# Patient Record
Sex: FEMALE | Race: WHITE | NOT HISPANIC OR LATINO | Employment: FULL TIME | URBAN - METROPOLITAN AREA
[De-identification: names, ages, dates, MRNs, and addresses within clinical notes are randomized per-mention and may not be internally consistent; named-entity substitution may affect disease eponyms.]

---

## 2017-02-06 ENCOUNTER — HOSPITAL ENCOUNTER (OUTPATIENT)
Dept: PHYSICAL MEDICINE AND REHAB | Facility: CLINIC | Age: 42
Discharge: HOME | End: 2017-02-06
Attending: NURSE PRACTITIONER

## 2017-02-06 DIAGNOSIS — M54.16 RIGHT LUMBAR RADICULITIS: ICD-10-CM

## 2017-02-06 DIAGNOSIS — M51.26 LUMBAR DISC HERNIATION: ICD-10-CM

## 2017-02-06 DIAGNOSIS — M54.41 RIGHT-SIDED LOW BACK PAIN WITH RIGHT-SIDED SCIATICA: ICD-10-CM

## 2017-07-23 ENCOUNTER — COMMUNICATION - HEALTHEAST (OUTPATIENT)
Dept: PHYSICAL MEDICINE AND REHAB | Facility: CLINIC | Age: 42
End: 2017-07-23

## 2017-07-23 DIAGNOSIS — M54.41 RIGHT-SIDED LOW BACK PAIN WITH RIGHT-SIDED SCIATICA: ICD-10-CM

## 2017-11-20 ENCOUNTER — HOSPITAL ENCOUNTER (OUTPATIENT)
Dept: PHYSICAL MEDICINE AND REHAB | Facility: CLINIC | Age: 42
Discharge: HOME | End: 2017-11-20
Attending: NURSE PRACTITIONER

## 2017-11-20 DIAGNOSIS — G89.29 CHRONIC RIGHT-SIDED LOW BACK PAIN WITH RIGHT-SIDED SCIATICA: ICD-10-CM

## 2017-11-20 DIAGNOSIS — M54.41 CHRONIC RIGHT-SIDED LOW BACK PAIN WITH RIGHT-SIDED SCIATICA: ICD-10-CM

## 2017-11-20 DIAGNOSIS — M51.26 LUMBAR DISC HERNIATION: ICD-10-CM

## 2017-11-20 DIAGNOSIS — M54.16 RIGHT LUMBAR RADICULITIS: ICD-10-CM

## 2018-03-05 ENCOUNTER — HOSPITAL ENCOUNTER (OUTPATIENT)
Dept: PHYSICAL MEDICINE AND REHAB | Facility: CLINIC | Age: 43
Discharge: HOME | End: 2018-03-05
Attending: PHYSICIAN ASSISTANT

## 2018-03-05 DIAGNOSIS — M51.26 LUMBAR DISC HERNIATION: ICD-10-CM

## 2018-03-05 DIAGNOSIS — M54.16 LUMBAR RADICULITIS: ICD-10-CM

## 2018-03-05 DIAGNOSIS — M54.16 RIGHT LUMBAR RADICULITIS: ICD-10-CM

## 2018-06-22 ENCOUNTER — HOSPITAL ENCOUNTER (OUTPATIENT)
Dept: PHYSICAL MEDICINE AND REHAB | Facility: CLINIC | Age: 43
Discharge: HOME | End: 2018-06-22
Attending: NURSE PRACTITIONER

## 2018-06-22 DIAGNOSIS — M79.18 MYOFASCIAL PAIN: ICD-10-CM

## 2018-06-22 DIAGNOSIS — M54.41 CHRONIC RIGHT-SIDED LOW BACK PAIN WITH RIGHT-SIDED SCIATICA: ICD-10-CM

## 2018-06-22 DIAGNOSIS — M54.16 LUMBAR RADICULITIS: ICD-10-CM

## 2018-06-22 DIAGNOSIS — M51.26 LUMBAR DISC HERNIATION: ICD-10-CM

## 2018-06-22 DIAGNOSIS — G89.29 CHRONIC RIGHT-SIDED LOW BACK PAIN WITH RIGHT-SIDED SCIATICA: ICD-10-CM

## 2018-07-06 ENCOUNTER — COMMUNICATION - HEALTHEAST (OUTPATIENT)
Dept: PHYSICAL MEDICINE AND REHAB | Facility: CLINIC | Age: 43
End: 2018-07-06

## 2018-07-09 ENCOUNTER — AMBULATORY - HEALTHEAST (OUTPATIENT)
Dept: PHYSICAL MEDICINE AND REHAB | Facility: CLINIC | Age: 43
End: 2018-07-09

## 2018-07-09 DIAGNOSIS — M54.41 CHRONIC RIGHT-SIDED LOW BACK PAIN WITH RIGHT-SIDED SCIATICA: ICD-10-CM

## 2018-07-09 DIAGNOSIS — M54.16 RIGHT LUMBAR RADICULITIS: ICD-10-CM

## 2018-07-09 DIAGNOSIS — G89.29 CHRONIC RIGHT-SIDED LOW BACK PAIN WITH RIGHT-SIDED SCIATICA: ICD-10-CM

## 2018-10-05 ENCOUNTER — HOSPITAL ENCOUNTER (OUTPATIENT)
Dept: PHYSICAL MEDICINE AND REHAB | Facility: CLINIC | Age: 43
Discharge: HOME | End: 2018-10-05
Attending: NURSE PRACTITIONER

## 2018-10-05 DIAGNOSIS — M54.16 RIGHT LUMBAR RADICULITIS: ICD-10-CM

## 2018-10-05 DIAGNOSIS — M54.16 LUMBAR RADICULITIS: ICD-10-CM

## 2018-10-05 DIAGNOSIS — M54.41 CHRONIC RIGHT-SIDED LOW BACK PAIN WITH RIGHT-SIDED SCIATICA: ICD-10-CM

## 2018-10-05 DIAGNOSIS — G89.29 CHRONIC RIGHT-SIDED LOW BACK PAIN WITH RIGHT-SIDED SCIATICA: ICD-10-CM

## 2018-10-05 DIAGNOSIS — M79.18 MYOFASCIAL PAIN: ICD-10-CM

## 2018-10-05 DIAGNOSIS — M51.26 LUMBAR DISC HERNIATION: ICD-10-CM

## 2019-02-14 ENCOUNTER — HOSPITAL ENCOUNTER (OUTPATIENT)
Dept: PHYSICAL MEDICINE AND REHAB | Facility: CLINIC | Age: 44
Discharge: HOME | End: 2019-02-14
Attending: NURSE PRACTITIONER

## 2019-02-14 DIAGNOSIS — M79.18 MYOFASCIAL PAIN: ICD-10-CM

## 2019-02-14 DIAGNOSIS — M51.26 LUMBAR DISC HERNIATION: ICD-10-CM

## 2019-02-14 DIAGNOSIS — M54.16 RIGHT LUMBAR RADICULITIS: ICD-10-CM

## 2019-02-14 DIAGNOSIS — G89.29 CHRONIC RIGHT-SIDED LOW BACK PAIN WITH RIGHT-SIDED SCIATICA: ICD-10-CM

## 2019-02-14 DIAGNOSIS — M54.41 CHRONIC RIGHT-SIDED LOW BACK PAIN WITH RIGHT-SIDED SCIATICA: ICD-10-CM

## 2019-04-28 ENCOUNTER — COMMUNICATION - HEALTHEAST (OUTPATIENT)
Dept: PHYSICAL MEDICINE AND REHAB | Facility: CLINIC | Age: 44
End: 2019-04-28

## 2019-04-28 DIAGNOSIS — M54.41 CHRONIC RIGHT-SIDED LOW BACK PAIN WITH RIGHT-SIDED SCIATICA: ICD-10-CM

## 2019-04-28 DIAGNOSIS — G89.29 CHRONIC RIGHT-SIDED LOW BACK PAIN WITH RIGHT-SIDED SCIATICA: ICD-10-CM

## 2019-04-28 DIAGNOSIS — M54.16 RIGHT LUMBAR RADICULITIS: ICD-10-CM

## 2019-06-21 ENCOUNTER — COMMUNICATION - HEALTHEAST (OUTPATIENT)
Dept: PHYSICAL MEDICINE AND REHAB | Facility: CLINIC | Age: 44
End: 2019-06-21

## 2019-06-21 DIAGNOSIS — M54.16 RIGHT LUMBAR RADICULITIS: ICD-10-CM

## 2019-06-21 DIAGNOSIS — G89.29 CHRONIC RIGHT-SIDED LOW BACK PAIN WITH RIGHT-SIDED SCIATICA: ICD-10-CM

## 2019-06-21 DIAGNOSIS — M54.41 CHRONIC RIGHT-SIDED LOW BACK PAIN WITH RIGHT-SIDED SCIATICA: ICD-10-CM

## 2019-06-25 ENCOUNTER — COMMUNICATION - HEALTHEAST (OUTPATIENT)
Dept: PHYSICAL MEDICINE AND REHAB | Facility: CLINIC | Age: 44
End: 2019-06-25

## 2019-06-25 DIAGNOSIS — M51.26 LUMBAR DISC HERNIATION: ICD-10-CM

## 2019-06-25 DIAGNOSIS — G89.29 CHRONIC RIGHT-SIDED LOW BACK PAIN WITH RIGHT-SIDED SCIATICA: ICD-10-CM

## 2019-06-25 DIAGNOSIS — M54.41 CHRONIC RIGHT-SIDED LOW BACK PAIN WITH RIGHT-SIDED SCIATICA: ICD-10-CM

## 2019-09-24 ENCOUNTER — HOSPITAL ENCOUNTER (OUTPATIENT)
Dept: PHYSICAL MEDICINE AND REHAB | Facility: CLINIC | Age: 44
Discharge: HOME | End: 2019-09-24
Attending: NURSE PRACTITIONER

## 2019-09-24 DIAGNOSIS — M51.26 LUMBAR DISC HERNIATION: ICD-10-CM

## 2019-09-24 DIAGNOSIS — M79.18 MYOFASCIAL PAIN: ICD-10-CM

## 2019-09-24 DIAGNOSIS — M54.16 RIGHT LUMBAR RADICULITIS: ICD-10-CM

## 2019-09-24 DIAGNOSIS — G89.29 CHRONIC RIGHT-SIDED LOW BACK PAIN WITH RIGHT-SIDED SCIATICA: ICD-10-CM

## 2019-09-24 DIAGNOSIS — M54.41 CHRONIC RIGHT-SIDED LOW BACK PAIN WITH RIGHT-SIDED SCIATICA: ICD-10-CM

## 2019-12-08 ENCOUNTER — COMMUNICATION - HEALTHEAST (OUTPATIENT)
Dept: PHYSICAL MEDICINE AND REHAB | Facility: CLINIC | Age: 44
End: 2019-12-08

## 2019-12-08 DIAGNOSIS — M54.16 RIGHT LUMBAR RADICULITIS: ICD-10-CM

## 2019-12-08 DIAGNOSIS — G89.29 CHRONIC RIGHT-SIDED LOW BACK PAIN WITH RIGHT-SIDED SCIATICA: ICD-10-CM

## 2019-12-08 DIAGNOSIS — M54.41 CHRONIC RIGHT-SIDED LOW BACK PAIN WITH RIGHT-SIDED SCIATICA: ICD-10-CM

## 2019-12-08 DIAGNOSIS — M51.26 LUMBAR DISC HERNIATION: ICD-10-CM

## 2019-12-28 ENCOUNTER — COMMUNICATION - HEALTHEAST (OUTPATIENT)
Dept: PHYSICAL MEDICINE AND REHAB | Facility: CLINIC | Age: 44
End: 2019-12-28

## 2019-12-28 DIAGNOSIS — M54.41 CHRONIC RIGHT-SIDED LOW BACK PAIN WITH RIGHT-SIDED SCIATICA: ICD-10-CM

## 2019-12-28 DIAGNOSIS — G89.29 CHRONIC RIGHT-SIDED LOW BACK PAIN WITH RIGHT-SIDED SCIATICA: ICD-10-CM

## 2019-12-28 DIAGNOSIS — M54.16 RIGHT LUMBAR RADICULITIS: ICD-10-CM

## 2020-01-10 ENCOUNTER — HOSPITAL ENCOUNTER (OUTPATIENT)
Dept: PHYSICAL MEDICINE AND REHAB | Facility: CLINIC | Age: 45
Discharge: HOME | End: 2020-01-10
Attending: NURSE PRACTITIONER

## 2020-01-10 DIAGNOSIS — M79.18 MYOFASCIAL PAIN: ICD-10-CM

## 2020-01-10 DIAGNOSIS — G89.29 CHRONIC RIGHT-SIDED LOW BACK PAIN WITH RIGHT-SIDED SCIATICA: ICD-10-CM

## 2020-01-10 DIAGNOSIS — M51.26 LUMBAR DISC HERNIATION: ICD-10-CM

## 2020-01-10 DIAGNOSIS — M54.41 CHRONIC RIGHT-SIDED LOW BACK PAIN WITH RIGHT-SIDED SCIATICA: ICD-10-CM

## 2020-01-10 DIAGNOSIS — M54.16 RIGHT LUMBAR RADICULITIS: ICD-10-CM

## 2020-01-10 ASSESSMENT — MIFFLIN-ST. JEOR: SCORE: 1406.55

## 2020-02-23 ENCOUNTER — COMMUNICATION - HEALTHEAST (OUTPATIENT)
Dept: PHYSICAL MEDICINE AND REHAB | Facility: CLINIC | Age: 45
End: 2020-02-23

## 2020-02-23 DIAGNOSIS — G89.29 CHRONIC RIGHT-SIDED LOW BACK PAIN WITH RIGHT-SIDED SCIATICA: ICD-10-CM

## 2020-02-23 DIAGNOSIS — M54.41 CHRONIC RIGHT-SIDED LOW BACK PAIN WITH RIGHT-SIDED SCIATICA: ICD-10-CM

## 2020-02-23 DIAGNOSIS — M54.16 RIGHT LUMBAR RADICULITIS: ICD-10-CM

## 2020-03-29 ENCOUNTER — COMMUNICATION - HEALTHEAST (OUTPATIENT)
Dept: PHYSICAL MEDICINE AND REHAB | Facility: CLINIC | Age: 45
End: 2020-03-29

## 2020-03-29 DIAGNOSIS — G89.29 CHRONIC RIGHT-SIDED LOW BACK PAIN WITH RIGHT-SIDED SCIATICA: ICD-10-CM

## 2020-03-29 DIAGNOSIS — M54.41 CHRONIC RIGHT-SIDED LOW BACK PAIN WITH RIGHT-SIDED SCIATICA: ICD-10-CM

## 2020-03-29 DIAGNOSIS — M54.16 RIGHT LUMBAR RADICULITIS: ICD-10-CM

## 2020-04-30 ENCOUNTER — COMMUNICATION - HEALTHEAST (OUTPATIENT)
Dept: PHYSICAL MEDICINE AND REHAB | Facility: CLINIC | Age: 45
End: 2020-04-30

## 2020-04-30 DIAGNOSIS — M51.26 LUMBAR DISC HERNIATION: ICD-10-CM

## 2020-04-30 DIAGNOSIS — M54.41 CHRONIC RIGHT-SIDED LOW BACK PAIN WITH RIGHT-SIDED SCIATICA: ICD-10-CM

## 2020-04-30 DIAGNOSIS — G89.29 CHRONIC RIGHT-SIDED LOW BACK PAIN WITH RIGHT-SIDED SCIATICA: ICD-10-CM

## 2020-05-05 ENCOUNTER — HOSPITAL ENCOUNTER (OUTPATIENT)
Dept: PHYSICAL MEDICINE AND REHAB | Facility: CLINIC | Age: 45
Discharge: HOME | End: 2020-05-05
Attending: NURSE PRACTITIONER

## 2020-05-05 DIAGNOSIS — M54.16 RIGHT LUMBAR RADICULITIS: ICD-10-CM

## 2020-05-05 DIAGNOSIS — M51.26 LUMBAR DISC HERNIATION: ICD-10-CM

## 2020-05-05 DIAGNOSIS — M54.41 CHRONIC RIGHT-SIDED LOW BACK PAIN WITH RIGHT-SIDED SCIATICA: ICD-10-CM

## 2020-05-05 DIAGNOSIS — G89.29 CHRONIC RIGHT-SIDED LOW BACK PAIN WITH RIGHT-SIDED SCIATICA: ICD-10-CM

## 2020-06-29 ENCOUNTER — COMMUNICATION - HEALTHEAST (OUTPATIENT)
Dept: PHYSICAL MEDICINE AND REHAB | Facility: CLINIC | Age: 45
End: 2020-06-29

## 2020-06-29 DIAGNOSIS — M54.16 RIGHT LUMBAR RADICULITIS: ICD-10-CM

## 2020-06-29 DIAGNOSIS — G89.29 CHRONIC RIGHT-SIDED LOW BACK PAIN WITH RIGHT-SIDED SCIATICA: ICD-10-CM

## 2020-06-29 DIAGNOSIS — M54.41 CHRONIC RIGHT-SIDED LOW BACK PAIN WITH RIGHT-SIDED SCIATICA: ICD-10-CM

## 2020-07-07 ENCOUNTER — COMMUNICATION - HEALTHEAST (OUTPATIENT)
Dept: PHYSICAL MEDICINE AND REHAB | Facility: CLINIC | Age: 45
End: 2020-07-07

## 2020-07-07 DIAGNOSIS — M54.16 RIGHT LUMBAR RADICULITIS: ICD-10-CM

## 2020-07-07 DIAGNOSIS — G89.29 CHRONIC RIGHT-SIDED LOW BACK PAIN WITH RIGHT-SIDED SCIATICA: ICD-10-CM

## 2020-07-07 DIAGNOSIS — M54.41 CHRONIC RIGHT-SIDED LOW BACK PAIN WITH RIGHT-SIDED SCIATICA: ICD-10-CM

## 2020-10-06 ENCOUNTER — COMMUNICATION - HEALTHEAST (OUTPATIENT)
Dept: PHYSICAL MEDICINE AND REHAB | Facility: CLINIC | Age: 45
End: 2020-10-06

## 2020-10-06 DIAGNOSIS — G89.29 CHRONIC RIGHT-SIDED LOW BACK PAIN WITH RIGHT-SIDED SCIATICA: ICD-10-CM

## 2020-10-06 DIAGNOSIS — M54.41 CHRONIC RIGHT-SIDED LOW BACK PAIN WITH RIGHT-SIDED SCIATICA: ICD-10-CM

## 2020-10-06 DIAGNOSIS — M54.16 RIGHT LUMBAR RADICULITIS: ICD-10-CM

## 2020-10-06 DIAGNOSIS — M51.26 LUMBAR DISC HERNIATION: ICD-10-CM

## 2020-10-07 RX ORDER — CYCLOBENZAPRINE HCL 5 MG
TABLET ORAL
Qty: 60 TABLET | Refills: 0 | Status: SHIPPED | OUTPATIENT
Start: 2020-10-07

## 2020-12-10 ENCOUNTER — HOSPITAL ENCOUNTER (OUTPATIENT)
Dept: PHYSICAL MEDICINE AND REHAB | Facility: CLINIC | Age: 45
Discharge: HOME | End: 2020-12-10
Attending: NURSE PRACTITIONER

## 2020-12-10 DIAGNOSIS — G89.29 CHRONIC RIGHT-SIDED LOW BACK PAIN WITH RIGHT-SIDED SCIATICA: ICD-10-CM

## 2020-12-10 DIAGNOSIS — M54.16 RIGHT LUMBAR RADICULITIS: ICD-10-CM

## 2020-12-10 DIAGNOSIS — M51.26 LUMBAR DISC HERNIATION: ICD-10-CM

## 2020-12-10 DIAGNOSIS — M79.18 MYOFASCIAL PAIN: ICD-10-CM

## 2020-12-10 DIAGNOSIS — M54.41 CHRONIC RIGHT-SIDED LOW BACK PAIN WITH RIGHT-SIDED SCIATICA: ICD-10-CM

## 2020-12-13 ENCOUNTER — COMMUNICATION - HEALTHEAST (OUTPATIENT)
Dept: PHYSICAL MEDICINE AND REHAB | Facility: CLINIC | Age: 45
End: 2020-12-13

## 2020-12-13 DIAGNOSIS — M54.41 CHRONIC RIGHT-SIDED LOW BACK PAIN WITH RIGHT-SIDED SCIATICA: ICD-10-CM

## 2020-12-13 DIAGNOSIS — M54.16 RIGHT LUMBAR RADICULITIS: ICD-10-CM

## 2020-12-13 DIAGNOSIS — G89.29 CHRONIC RIGHT-SIDED LOW BACK PAIN WITH RIGHT-SIDED SCIATICA: ICD-10-CM

## 2021-01-04 ENCOUNTER — COMMUNICATION - HEALTHEAST (OUTPATIENT)
Dept: PHYSICAL MEDICINE AND REHAB | Facility: CLINIC | Age: 46
End: 2021-01-04

## 2021-01-04 DIAGNOSIS — M54.16 RIGHT LUMBAR RADICULITIS: ICD-10-CM

## 2021-01-04 DIAGNOSIS — M54.41 CHRONIC RIGHT-SIDED LOW BACK PAIN WITH RIGHT-SIDED SCIATICA: ICD-10-CM

## 2021-01-04 DIAGNOSIS — G89.29 CHRONIC RIGHT-SIDED LOW BACK PAIN WITH RIGHT-SIDED SCIATICA: ICD-10-CM

## 2021-03-18 ENCOUNTER — COMMUNICATION - HEALTHEAST (OUTPATIENT)
Dept: PHYSICAL MEDICINE AND REHAB | Facility: CLINIC | Age: 46
End: 2021-03-18

## 2021-03-18 DIAGNOSIS — G89.29 CHRONIC RIGHT-SIDED LOW BACK PAIN WITH RIGHT-SIDED SCIATICA: ICD-10-CM

## 2021-03-18 DIAGNOSIS — M54.16 RIGHT LUMBAR RADICULITIS: ICD-10-CM

## 2021-03-18 DIAGNOSIS — M54.41 CHRONIC RIGHT-SIDED LOW BACK PAIN WITH RIGHT-SIDED SCIATICA: ICD-10-CM

## 2021-05-22 ENCOUNTER — COMMUNICATION - HEALTHEAST (OUTPATIENT)
Dept: PHYSICAL MEDICINE AND REHAB | Facility: CLINIC | Age: 46
End: 2021-05-22

## 2021-05-22 DIAGNOSIS — G89.29 CHRONIC RIGHT-SIDED LOW BACK PAIN WITH RIGHT-SIDED SCIATICA: ICD-10-CM

## 2021-05-22 DIAGNOSIS — M54.41 CHRONIC RIGHT-SIDED LOW BACK PAIN WITH RIGHT-SIDED SCIATICA: ICD-10-CM

## 2021-05-22 DIAGNOSIS — M54.16 RIGHT LUMBAR RADICULITIS: ICD-10-CM

## 2021-05-24 RX ORDER — GABAPENTIN 300 MG/1
CAPSULE ORAL
Qty: 270 CAPSULE | Refills: 3 | Status: SHIPPED | OUTPATIENT
Start: 2021-05-24 | End: 2021-12-06

## 2021-05-24 RX ORDER — IBUPROFEN 600 MG/1
TABLET, FILM COATED ORAL
Qty: 90 TABLET | Refills: 0 | Status: SHIPPED | OUTPATIENT
Start: 2021-05-24 | End: 2021-08-23

## 2021-05-28 NOTE — TELEPHONE ENCOUNTER
Call placed to pt, stating refill should be at pharmacy since she was given 3 refills. Pt verbalized understanding. She will  refill at the pharmacy. Informed her to contact [facility] with any questions or concerns.

## 2021-05-28 NOTE — TELEPHONE ENCOUNTER
If the patient was given 3 refills of the prescription in February, there should be a refill waiting at the pharmacy.  Please confirm.

## 2021-05-28 NOTE — TELEPHONE ENCOUNTER
I called and spoke with pt to verify how many capsules she is taking per day since it's been a while since Janneth refilled her Gabapentin 300 mg.     Per pt, she is taking Gabapentin 3 caps at bedtime and then will only take 1 cap in the morning PRN. During the day time she gets very tire from taking Gabapentin so does take it mainly at night time per pt.   _______________________________  Janneth out of office    Pharmacy sent refill request for gabapentin   --Med last Rx 2/14/19 #270, 3 refill  --Last OV 2/14/19   --Future appt: none  --Please advise

## 2021-05-28 NOTE — TELEPHONE ENCOUNTER
In further review, her last refill may have been in October.  I will provide 1 month refill for the patient at 4 capsules/day.

## 2021-05-30 VITALS — WEIGHT: 143 LBS | BODY MASS INDEX: 23.8 KG/M2

## 2021-05-31 ENCOUNTER — RECORDS - HEALTHEAST (OUTPATIENT)
Dept: ADMINISTRATIVE | Facility: CLINIC | Age: 46
End: 2021-05-31

## 2021-05-31 VITALS — WEIGHT: 158 LBS | BODY MASS INDEX: 26.29 KG/M2

## 2021-06-01 VITALS — BODY MASS INDEX: 26.79 KG/M2 | WEIGHT: 161 LBS

## 2021-06-01 VITALS — WEIGHT: 157 LBS | BODY MASS INDEX: 26.13 KG/M2

## 2021-06-01 NOTE — PATIENT INSTRUCTIONS - HE
Discussed the risks (eg, addiction, overdose, worsening pain) verses benefit of opioid use with patient today. Explained that this medication will not be a long term solution to ongoing pain. Discussed using lowest effective dose and the importance of other measures for pain management including PT, other non-opioid medications, behavioral treatments, and other procedure options.   For any further refills on opioid pain medication you must come in to the clinic in person to discuss further in which you may or may not receive refills.    A Medrol Dose Pack (Prednisone Taper) was prescribed today for your acute pain. Please follow the dosing instruction on prescription bottle.   POSSIBLE STEROID SIDE EFFECTS   -If you experience these, it should only last for a short period-   Change in menstrual flow   Swelling   Increased appetite   Skin redness (flushness)   Skin rash   Mouth (oral) irritation   Blood sugar (glucose) levels   Sweats   Mood changes    Discussed the importance of core strengthening, ROM, stretching exercises with the patient and how each of these entities is important in decreasing pain.  Explained to the patient that the purpose of physical therapy is to teach the patient a home exercise program.  These exercises need to be performed every day in order to decrease pain and prevent future occurrences of pain.       ~Please call Nurse Navigation line [phone] with any questions or concerns about your treatment plan, if symptoms worsen and you would like to be seen urgently, or if you have problems controlling bladder and bowel function.

## 2021-06-01 NOTE — PROGRESS NOTES
Assessment:     Diagnoses and all orders for this visit:    Chronic right-sided low back pain with right-sided sciatica  -     predniSONE (DELTASONE) 10 mg tablet pack; Take by mouth daily. Three 10 mg tablets daily for 3 days, then two 10 mg tablets for 3 days, then one 10 mg tablet for 3 days.  Dispense: 18 tablet; Refill: 0  -     oxyCODONE-acetaminophen (PERCOCET/ENDOCET) 5-325 mg per tablet; Take 1 tablet by mouth every 8 (eight) hours as needed for pain (Severe breakthrough pain).  Dispense: 9 tablet; Refill: 0  -     gabapentin (NEURONTIN) 300 MG capsule; Take 3 capsules (900 mg total) by mouth 3 (three) times a day.  Dispense: 270 capsule; Refill: 3    Right lumbar radiculitis  -     predniSONE (DELTASONE) 10 mg tablet pack; Take by mouth daily. Three 10 mg tablets daily for 3 days, then two 10 mg tablets for 3 days, then one 10 mg tablet for 3 days.  Dispense: 18 tablet; Refill: 0  -     oxyCODONE-acetaminophen (PERCOCET/ENDOCET) 5-325 mg per tablet; Take 1 tablet by mouth every 8 (eight) hours as needed for pain (Severe breakthrough pain).  Dispense: 9 tablet; Refill: 0  -     gabapentin (NEURONTIN) 300 MG capsule; Take 3 capsules (900 mg total) by mouth 3 (three) times a day.  Dispense: 270 capsule; Refill: 3    Lumbar disc herniation    Myofascial pain       Nathalie Erwin is a 44 y.o. y.o. female with past medical history significant for current smoker who presents today for follow-up regarding:    -Acute flareup of chronic right-sided low back pain at the beltline that radiates to the right buttock lateral hip with numbness into the lateral thigh and lateral shin consistent with L5 radiculopathy.  Symptoms significant x2 weeks.  The patient does have degenerative changes at L4-5 on previous imaging.    Patient is neurologically intact on exam.     Plan:     A shared decision making plan was used. The patient's values and choices were respected. Prior medical records from 2/14/19 were reviewed today.  The following represent/s what was discussed and decided upon by the provider and the patient.        -DIAGNOSTIC TESTS: Images were personally reviewed and interpreted.   --Discussed that if symptoms are not improving with Medrol Dosepak, or flareups become more frequent I would recommend updated lumbar spine MRI.  Patient is okay with holding off at this point as she is hoping to avoid any injections and surgery options.  --Previous lumbar MRI 2015 reveals right lateral recess stenosis L4-5 due to right paracentral disc protrusion and annular fissure with moderate foraminal stenosis that is still likely contribute to her acute on chronic flare up of her radicular pain at this time.    -INTERVENTIONS: No recommendations for injections at this time however pending updated imaging if symptoms are not improving right L4-5 TFESI could be consideration.    -MEDICATIONS: Prescribed Medrol Dosepak for acute on chronic flareup in her pain at this time.  She has done well with these in the past.  Discussed with patient that I would not recommend them on a regular basis more than a couple of times a year however, she has not had one in the last 1 year.  -Refill gabapentin 300 mg which she is currently taking 3 tablets 3 times daily with significant benefit.  Did discuss that when her symptoms are more tolerable she can wean to a lower dose 2 tablets 3 times daily however reasonable to continue higher dose at this time as it is significantly helpful for her.  -Prescribed Percocet 5/325 mg 1 tablet every 8 hours as needed for severe breakthrough pain, number 9 tablets given for 3 days worth. MN  checked. Discussed the risks (eg, addiction, overdose, worsening pain) verses benefit of opioid use with patient today. Explained that this medication will not be a long term solution to ongoing pain. Discussed using lowest effective dose and the importance of other measures for pain management including PT, other non-opioid  medications, behavioral treatments, and other procedure options.   Discussed side effects of medications and proper use. Patient verbalized understanding.    -PHYSICAL THERAPY: Discussed that if symptoms are not improving with Medrol Dosepak would recommend revisiting physical therapy [facility] program.  Discussed the importance of core strengthening, ROM, stretching exercises with the patient and how each of these entities is important in decreasing pain.  Explained to the patient that the purpose of physical therapy is to teach the patient a home exercise program.  These exercises need to be performed every day in order to decrease pain and prevent future occurrences of pain.        -PATIENT EDUCATION:  25 minutes of total visit time was spent face to face with the patient today, 60 % of the visit was spent on counseling, education, and coordinating care.   -5 minutes spent outside of visit time, non-face-to-face time, reviewing chart.    -FOLLOW UP: Follow-up as needed if symptoms are not improving or new symptoms arise  Advised to contact clinic if symptoms worsen or change.    Subjective:     Nathalie Erwin is a 44 y.o. female who presents today for follow-up regarding chronic bilateral low back pain that is been most significant for the last 2 weeks on the right again right at the beltline that radiates to the right posterior buttock right lateral hip with numbness into the lateral thigh and lateral shin.  Patient reports of the same pain that she has had in the past however she is been doing great since February 2019 up until long car drive over 8 hours recently with a lot of moving household items recently.  Patient denies any lower extremity weakness, denies recent trips or falls or balance changes, denies bowel or bladder loss control, denies saddle anesthesia.    Patient reports that her flareups have been less frequent in the last couple of years and she is really hoping to avoid type of surgery or injections  as symptoms have been tolerable.    Treatment to Date:   Physical therapy in August 2015 with [facility] in [place] with some relief.  Physical therapy [facility] program [facility] 12 sessions last 9/20/2016 with minimal to no relief.  Patient continues to do daily home exercise program.      Medications:  Gabapentin 600 mg HS  Flexeril and ibuprofen with good relief.   Percocet for severe pain at [facility], takes very infrequently.  Medrol Dosepak 9/20/2016 and 2/6/17 with significant relief.   Methocarbamol with some benefit but she feels Flexeril is better at at bedtime    Current Outpatient Medications on File Prior to Encounter   Medication Sig Dispense Refill     cyclobenzaprine (FLEXERIL) 5 MG tablet TAKE 1 TO 2 TABLETS(5 TO 10 MG) BY MOUTH AT BEDTIME AS NEEDED FOR MUSCLE SPASMS 60 tablet 0     ibuprofen (ADVIL,MOTRIN) 600 MG tablet Take 1 tablet (600 mg total) by mouth every 8 (eight) hours as needed for pain. Take with FOOD 91 tablet 1     [DISCONTINUED] gabapentin (NEURONTIN) 300 MG capsule Take 2 capsules (600 mg total) by mouth 3 (three) times a day. 180 capsule 3     DOCOSAHEXANOIC ACID/EPA (FISH OIL ORAL) Take 1 tablet by mouth daily.       FLAXSEED OIL ORAL Take 1 tablet by mouth daily.       No current facility-administered medications on file prior to encounter.        Allergies   Allergen Reactions     Shellfish Containing Products      Crab leg made her face swell up x 10 years ago. Able to eat shrimp and lobster with no reaction.        No past medical history on file.     Review of Systems  ROS: Positive for numbness and tingling.  Specifically negative for bowel/bladder dysfunction, balance changes, headache, dizziness, foot drop, fevers, chills, appetite changes, nausea/vomiting, unexplained weight loss. Otherwise 13 systems reviewed are negative. Please see the patient's intake questionnaire from today for details.    Reviewed Social, Family, Past Medical and Past Surgical  history with patient, no significant changes noted since prior visit.     Objective:     /81 (Patient Site: Right Arm, Patient Position: Sitting)   Pulse (!) 103   Wt 156 lb (70.8 kg)   LMP 08/01/2019   SpO2 98%   BMI 25.96 kg/m      PHYSICAL EXAMINATION:    --CONSTITUTIONAL: Well developed, well nourished, healthy appearing individual.  --PSYCHIATRIC: Appropriate mood and affect. No difficulty interacting due to temper, social withdrawal, or memory issues.  --SKIN: Lumbar region is dry and intact.   --RESPIRATORY: Normal rhythm and effort. No abnormal accessory muscle breathing patterns noted.   --MUSCULOSKELETAL:  Normal lumbar lordosis noted, no lateral shift.  --GROSS MOTOR: Easily arises from a seated position. Gait is non-antalgic  --LUMBAR SPINE:  Inspection reveals no evidence of deformity. Range of motion is limited in all movements: Lumbar flexion, extension, lateral rotation. No tenderness to palpation lumbar spine. Straight leg raising in the seated position is negative to radicular pain on the left and positive on the right. Sciatic notch non-tender.   --SACROILIAC JOINT: One Finger point test negative.  --LOWER EXTREMITY MOTOR TESTING:  Plantar flexion left 5/5, right 5/5   Dorsiflexion left 5/5, right 5/5   Great toe MTP extension left 5/5, right 5/5  Knee flexion left 5/5, right 5/5  Knee extension left 5/5, right 5/5   Hip flexion left 5/5, right 5/5  Hip abduction left 5/5, right 5/5  Hip adduction left 5/5, right 5/5   --HIPS: Full range of motion bilaterally.    --NEUROLOGIC: Bilateral patellar and achilles reflexes are physiologic and symmetric. Sensation to light touch is intact on the left, diminished into the right lateral shin only.    RESULTS:   Imaging: Lumbar spine imaging was reviewed today. The images were shown to the patient and the findings were explained using a spine model.      Lumbar MRI   6/16/2015  CONCLUSION:   1. At L3-L4 there is a small to moderate-sized left  paracentral disc extrusion extending into the left lateral recess, contributing to moderate left lateral recess stenosis and possible impingement of the traversing left L4 nerve root.   2. At L4-L5 there is mild right lateral recess stenosis secondary to a shallow right paracentral disc protrusion with annular fissure. Mild to moderate right and mild left foraminal stenoses secondary to a diffuse annular bulge with osteophytic ridging.   3. Moderate Modic type I changes at the L3-L4 level.

## 2021-06-02 VITALS — BODY MASS INDEX: 27.12 KG/M2 | WEIGHT: 163 LBS

## 2021-06-02 VITALS — WEIGHT: 161 LBS | BODY MASS INDEX: 26.79 KG/M2

## 2021-06-03 VITALS — WEIGHT: 156 LBS | BODY MASS INDEX: 25.96 KG/M2

## 2021-06-04 VITALS — WEIGHT: 168.8 LBS | BODY MASS INDEX: 28.12 KG/M2 | HEIGHT: 65 IN

## 2021-06-05 NOTE — PROGRESS NOTES
Assessment:     Diagnoses and all orders for this visit:    Chronic right-sided low back pain with right-sided sciatica  -     predniSONE (DELTASONE) 10 mg tablet pack; Take by mouth daily. Three 10 mg tablets daily for 3 days, then two 10 mg tablets for 3 days, then one 10 mg tablet for 3 days.  Dispense: 18 tablet; Refill: 0  -     oxyCODONE-acetaminophen (PERCOCET/ENDOCET) 5-325 mg per tablet; Take 1 tablet by mouth every 8 (eight) hours as needed for pain (Severe pain).  Dispense: 15 tablet; Refill: 0    Right lumbar radiculitis  -     predniSONE (DELTASONE) 10 mg tablet pack; Take by mouth daily. Three 10 mg tablets daily for 3 days, then two 10 mg tablets for 3 days, then one 10 mg tablet for 3 days.  Dispense: 18 tablet; Refill: 0  -     oxyCODONE-acetaminophen (PERCOCET/ENDOCET) 5-325 mg per tablet; Take 1 tablet by mouth every 8 (eight) hours as needed for pain (Severe pain).  Dispense: 15 tablet; Refill: 0    Lumbar disc herniation    Myofascial pain       Nathalie Erwin is a 44 y.o. y.o. female with past medical history significant for current smoker who presents today for follow-up regarding:    -Acute flareup of chronic right-sided low back pain at the beltline into the right buttock and lateral hip without further leg pain x1 week that is severe.  Previous flareup September 2019 did resolve with Medrol Dosepak.     Patient is neurologically intact on exam.     Plan:     A shared decision making plan was used. The patient's values and choices were respected. Prior medical records from 9/24/2019 were reviewed today. The following represents what was discussed and decided upon by the provider and the patient.        -DIAGNOSTIC TESTS: Images were personally reviewed and interpreted.   --Discussed that if she continues to have flareups every 3-5 months I would recommend an updated lumbar spine MRI, patient prefers to hold off at this time as she states her pain is been typically very tolerable and  manageable other than a few flareups a couple of times a year.  --Previous lumbar MRI 2015 reveals right lateral recess stenosis L4-5 due to right paracentral disc protrusion and annular fissure with moderate foraminal stenosis that is still likely contribute to her acute on chronic flare up of her radicular pain at this time.    -INTERVENTIONS: No injection recommendations at this time.    -MEDICATIONS: Refilled Medrol Dosepak today for acute pain flareup.  -Also refilled Percocet 5/325 mg 1 tablet every 8 hours as needed for severe breakthrough pain, number 14 tablets given.  Discussed with patient that this is for acute pain only and not something that she should take on a regular basis long-term.  If she it is that she is needing this refill more than a couple of times a year then I would recommend considering other medication options and updated imaging to find other options such as potential injections or further physical therapy to calm her pain down.  [place]  checked. Discussed the risks (eg, addiction, overdose, worsening pain) verses benefit of opioid use with patient today. Explained that this medication will not be a long term solution to ongoing pain. Discussed using lowest effective dose and the importance of other measures for pain management including PT, other non-opioid medications, behavioral treatments, and other procedure options.   Discussed side effects of medications and proper use. Patient verbalized understanding.    -PHYSICAL THERAPY: Advised patient continue with home exercises on a regular basis at this time which she is diligent about doing.  Discussed the importance of core strengthening, ROM, stretching exercises with the patient and how each of these entities is important in decreasing pain.  Explained to the patient that the purpose of physical therapy is to teach the patient a home exercise program.  These exercises need to be performed every day in order to decrease pain and  prevent future occurrences of pain.        -PATIENT EDUCATION:  20 minutes of total visit time was spent face to face with the patient today, 60 % of the visit was spent on counseling, education, and coordinating care.   -5 minutes spent outside of visit time, non-face-to-face time, reviewing chart.    -FOLLOW UP: Follow-up as needed if symptoms are not improving or new symptoms arise.  Advised to contact clinic if symptoms worsen or change.    Subjective:     Nathalie Erwin is a 44 y.o. female who presents today for follow-up regarding acute flareup of chronic bilateral low back pain that is worse on the right that radiates into the right lumbosacral junction and right buttock as well as right lateral hip without further leg pain at this time.  She reports that after her Medrol Dosepak in September 2019 her pain did subside and she had been doing well up until the last 1 week after a long car drive over the holidays and now her pain is been more significant at a 7/10, and 8 at its worst, a 3 at its best.  Overall symptoms are more bothersome with walking and back extension does improve with sitting still.  Patient denies lower extremity weakness, denies recent trips or falls or balance changes, denies bowel or bladder loss control.    Treatment to Date:   Physical therapy in August 2015 with [facility] in [place] with some relief.  Physical therapy [facility] program [facility] 12 sessions last 9/20/2016 with minimal to no relief.  Patient continues to do daily home exercise program.      Medications:  Gabapentin 600 mg HS  Flexeril and ibuprofen with good relief.   Medrol Dosepak 9/20/2016 and 2/6/17 with significant relief.   Methocarbamol with some benefit but she feels Flexeril is better at at bedtime.  Medrol Dosepak prescribed 9/24/2019.    Percocet for severe pain at night, takes very infrequently.    Current Outpatient Medications on File Prior to Encounter   Medication Sig Dispense  "Refill     cyclobenzaprine (FLEXERIL) 5 MG tablet TAKE 1 TO 2 TABLETS(5 TO 10 MG) BY MOUTH AT BEDTIME AS NEEDED FOR MUSCLE SPASMS 60 tablet 0     DOCOSAHEXANOIC ACID/EPA (FISH OIL ORAL) Take 1 tablet by mouth daily.       FLAXSEED OIL ORAL Take 1 tablet by mouth daily.       gabapentin (NEURONTIN) 300 MG capsule TAKE 3 CAPSULES(900 MG) BY MOUTH THREE TIMES DAILY 270 capsule 3     ibuprofen (ADVIL,MOTRIN) 600 MG tablet TAKE 1 TABLET(600 MG) BY MOUTH EVERY 8 HOURS WITH FOOD AS NEEDED FOR PAIN 91 tablet 0     [DISCONTINUED] predniSONE (DELTASONE) 10 mg tablet pack Take by mouth daily. Three 10 mg tablets daily for 3 days, then two 10 mg tablets for 3 days, then one 10 mg tablet for 3 days. 18 tablet 0     No current facility-administered medications on file prior to encounter.        Allergies   Allergen Reactions     Shellfish Containing Products      Crab leg made her face swell up x 10 years ago. Able to eat shrimp and lobster with no reaction.        No past medical history on file.     Review of Systems  ROS:  Specifically negative for bowel/bladder dysfunction, balance changes, headache, dizziness, foot drop, fevers, chills, appetite changes, nausea/vomiting, unexplained weight loss. Otherwise 13 systems reviewed are negative. Please see the patient's intake questionnaire from today for details.    Reviewed Social, Family, Past Medical and Past Surgical history with patient, no significant changes noted since prior visit.     Objective:     BP (!) 167/92 (Patient Site: Left Arm, Patient Position: Sitting, Cuff Size: Adult Regular)   Pulse 91   Ht 5' 5\" (1.651 m)   Wt 168 lb 12.8 oz (76.6 kg)   BMI 28.09 kg/m      PHYSICAL EXAMINATION:    --CONSTITUTIONAL: Well developed, well nourished, healthy appearing individual.  --PSYCHIATRIC: Appropriate mood and affect. No difficulty interacting due to temper, social withdrawal, or memory issues.  --SKIN: Lumbar region is dry and intact.   --RESPIRATORY: Normal rhythm " and effort. No abnormal accessory muscle breathing patterns noted.   --MUSCULOSKELETAL:  Normal lumbar lordosis noted, no lateral shift.  --GROSS MOTOR: Easily arises from a seated position. Gait is non-antalgic  --LUMBAR SPINE:  Inspection reveals no evidence of deformity. Range of motion is limited in all movements due to pain: Lumbar flexion, extension, lateral rotation.  Tenderness to palpation lumbar sacral junction.  Straight leg raising in the seated position is negative to radicular pain. Sciatic notch non-tender.   --SACROILIAC JOINT: One Finger point test negative.  --LOWER EXTREMITY MOTOR TESTING:  Plantar flexion left 5/5, right 5/5   Dorsiflexion left 5/5, right 5/5   Great toe MTP extension left 5/5, right 5/5  Knee flexion left 5/5, right 5/5  Knee extension left 5/5, right 5/5   Hip flexion left 5/5, right 5/5  Hip abduction left 5/5, right 5/5  Hip adduction left 5/5, right 5/5   --HIPS: Full range of motion bilaterally.   --NEUROLOGIC: Bilateral patellar and achilles reflexes are physiologic and symmetric. Sensation to light touch is intact in the bilateral L4, L5, and S1 dermatomes.    RESULTS:   Imaging: Lumbar spine imaging was reviewed today. The images were shown to the patient and the findings were explained using a spine model.      Lumbar MRI   6/16/2015  CONCLUSION:   1. At L3-L4 there is a small to moderate-sized left paracentral disc extrusion extending into the left lateral recess, contributing to moderate left lateral recess stenosis and possible impingement of the traversing left L4 nerve root.   2. At L4-L5 there is mild right lateral recess stenosis secondary to a shallow right paracentral disc protrusion with annular fissure. Mild to moderate right and mild left foraminal stenoses secondary to a diffuse annular bulge with osteophytic ridging.   3. Moderate Modic type I changes at the L3-L4 level.

## 2021-06-05 NOTE — PATIENT INSTRUCTIONS - HE

## 2021-06-07 NOTE — PROGRESS NOTES
"Nathalie Erwin is a 45 y.o. female who is being evaluated via a billable video visit.      The patient has been notified of following:     \"This video visit will be conducted via a call between you and your physician/provider. We have found that certain health care needs can be provided without the need for an in-person physical exam.  This service lets us provide the care you need with a video conversation.  If a prescription is necessary we can send it directly to your pharmacy.  If lab work is needed we can place an order for that and you can then stop by our lab to have the test done at a later time.    Video visits are billed at different rates depending on your insurance coverage. Please reach out to your insurance provider with any questions.    If during the course of the call the physician/provider feels a video visit is not appropriate, you will not be charged for this service.\"    Patient has given verbal consent to a Video visit? Yes    Patient would like to receive their AVS by AVS Preference: Anshu.    Patient would like the video invitation sent by: Text to cell phone: [phone]    Will anyone else be joining your video visit? No        Video Start Time: 8:27 AM    Video-Visit Details    Type of service:  Video Visit    Video End Time (time video stopped): 8:39 AM  Originating Location (pt. Location): Home    Distant Location (provider location):  [facility]     Platform used for Video Visit: Libra Willett CNP    Assessment:     Diagnoses and all orders for this visit:    Chronic right-sided low back pain with right-sided sciatica  -     methylPREDNISolone (MEDROL, DEEPTI,) 4 mg tablet; 3 tablets daily for 3 days, then 2 tablets daily for 3 days, then 1 tablet daily for 3 days then stop  Dispense: 18 tablet; Refill: 0  -     oxyCODONE-acetaminophen (PERCOCET/ENDOCET) 5-325 mg per tablet; Take 1 tablet by mouth every 8 (eight) hours as needed for pain (Severe " breakthrough pain).  Dispense: 10 tablet; Refill: 0    Right lumbar radiculitis  -     methylPREDNISolone (MEDROL, DEEPTI,) 4 mg tablet; 3 tablets daily for 3 days, then 2 tablets daily for 3 days, then 1 tablet daily for 3 days then stop  Dispense: 18 tablet; Refill: 0  -     oxyCODONE-acetaminophen (PERCOCET/ENDOCET) 5-325 mg per tablet; Take 1 tablet by mouth every 8 (eight) hours as needed for pain (Severe breakthrough pain).  Dispense: 10 tablet; Refill: 0    Lumbar disc herniation  -     methylPREDNISolone (MEDROL, DEEPTI,) 4 mg tablet; 3 tablets daily for 3 days, then 2 tablets daily for 3 days, then 1 tablet daily for 3 days then stop  Dispense: 18 tablet; Refill: 0       Nathalie Erwin is a 45 y.o. y.o. female with past medical history significant for current smoker who presents today for follow-up regarding:    -Acute flareup of chronic right-sided low back pain with paresthesias into the right lower extremity, flareup x2 weeks.  Patient denies weakness or recent trips or falls and reports symptoms are similar to previous flareups last in January 2020 with relief with Medrol Dosepak.     Plan:     A shared decision making plan was used. The patient's values and choices were respected. Prior medical records from 1/10/2020 were reviewed today. The following represents what was discussed and decided upon by the provider and the patient.        -DIAGNOSTIC TESTS: Images were personally reviewed and interpreted.   --No need for further imaging at this time however we did discuss that if her pain does not improve with Medrol Dosepak or she has more frequent flareups than twice a year would recommend updated imaging.  Patient wishes to hold off on imaging at this time.  --Previous lumbar MRI 2015 reveals right lateral recess stenosis L4-5 due to right paracentral disc protrusion and annular fissure with moderate foraminal stenosis that is still likely contribute to her acute on chronic flare up of her radicular pain at  this time.    -MEDICATIONS: Prescribed low-dose Medrol Dosepak today for acute flareup of chronic right lumbar radiculitis she is gotten relief with this in the past last in January 2020.  Did discuss with patient as this is an immunosuppressant there is a potential risk of worsening COVID if she should contract the SARS-CoV-2/coronavirus.  Patient verbalizes understanding of this risk and wishes to still trial Medrol Dosepak.  -Prescribed Percocet 5/325 mg 1 tablet 3 times daily as needed for severe breakthrough pain number 15 tablets given for 5 days worth.  Last prescription was in January of this medication and she takes it only as needed for severe breakthrough pain for flareups.  Patient is aware that this is not a good medication to take long-term on a regular basis and she also does not wish to take it in this manner.  MN  checked. Discussed the risks (eg, addiction, overdose, worsening pain) verses benefit of opioid use with patient today. Explained that this medication will not be a long term solution to ongoing pain. Discussed using lowest effective dose and the importance of other measures for pain management including PT, other non-opioid medications, behavioral treatments, and other procedure options.   Discussed side effects of medications and proper use. Patient verbalized understanding.    -PHYSICAL THERAPY: Highly encourage patient to continue with home exercises from prior physical therapy sessions.  Discussed the importance of core strengthening, ROM, stretching exercises with the patient and how each of these entities is important in decreasing pain.  Explained to the patient that the purpose of physical therapy is to teach the patient a home exercise program.  These exercises need to be performed every day in order to decrease pain and prevent future occurrences of pain.        -PATIENT EDUCATION:  12 minutes of total visit time was spent face to face with the patient today, 60 % of the  visit was spent on counseling, education, and coordinating care.   -5 minutes spent outside of visit time, non-face-to-face time, reviewing chart.    -FOLLOW UP: Follow-up as needed  Advised to contact clinic if symptoms worsen or change.    Subjective:     Nathalie Erwin is a 45 y.o. female who presents today for follow-up regarding chronic right-sided low back pain with recent flareup in the last couple of weeks after helping her grandson learn to ride a bike where she was bending over for extended periods of time.  Since then her pain is been significantly aggravated currently her pain is a 7/10, 9 at its worst, a 4 at its best and she does report that bending and twisting is most bothersome as well as she has significant difficulties straightening up due to the back pain.    Patient does have recurrent numbness and tingling into her right lower extremity into the right calf specifically she states however denies any right lower extremity weakness, denies any recent trips or falls, denies right leg pain, denies bowel or bladder loss control, denies saddle anesthesia.  Patient reports that this is the same pain she has had in the past and denies any new symptoms, just worsening flareup pain.    Treatment to Date:   Physical therapy in August 2015 with [facility] in [place] with some relief.  Physical therapy [facility] program [facility] 12 sessions last 9/20/2016 with minimal to no relief.  Patient continues to do daily home exercise program.      Medications:  Gabapentin 600 mg HS  Flexeril and ibuprofen with good relief.   Medrol Dosepak 9/20/2016 and 2/6/17 with significant relief.   Methocarbamol with some benefit but she feels Flexeril is better at at bedtime.  Medrol Dosepak prescribed 9/24/2019 and 1/10/2020 with significant benefit.     Percocet for severe pain at night, takes very infrequently.    Current Outpatient Medications on File Prior to Encounter   Medication Sig Dispense  Refill     cyclobenzaprine (FLEXERIL) 5 MG tablet TAKE 1 TO 2 TABLETS(5 TO 10 MG) BY MOUTH AT BEDTIME AS NEEDED FOR MUSCLE SPASMS 60 tablet 0     DOCOSAHEXANOIC ACID/EPA (FISH OIL ORAL) Take 1 tablet by mouth daily.       FLAXSEED OIL ORAL Take 1 tablet by mouth daily.       gabapentin (NEURONTIN) 300 MG capsule TAKE 3 CAPSULES BY MOUTH THREE TIMES DAILY 270 capsule 3     ibuprofen (ADVIL,MOTRIN) 600 MG tablet TAKE 1 TABLET(600 MG) BY MOUTH EVERY 8 HOURS WITH FOOD AS NEEDED FOR PAIN 90 tablet 0     predniSONE (DELTASONE) 10 mg tablet pack Take by mouth daily. Three 10 mg tablets daily for 3 days, then two 10 mg tablets for 3 days, then one 10 mg tablet for 3 days. 18 tablet 0     No current facility-administered medications on file prior to encounter.        Allergies   Allergen Reactions     Shellfish Containing Products      Crab leg made her face swell up x 10 years ago. Able to eat shrimp and lobster with no reaction.        Review of Systems  ROS: Positive for numbness and tingling and perceived weakness.  Specifically negative for bowel/bladder dysfunction, balance changes, headache, dizziness, foot drop, fevers, chills, appetite changes, nausea/vomiting, unexplained weight loss. Otherwise 13 systems reviewed are negative. Please see the patient's intake questionnaire from today for details.    Reviewed Social, Family, Past Medical and Past Surgical history with patient, no significant changes noted since prior visit.     Objective:     Video PHYSICAL EXAMINATION:    --CONSTITUTIONAL: Well developed, well nourished, healthy appearing individual.  --PSYCHIATRIC: Appropriate mood and affect. No difficulty interacting due to temper, social withdrawal, or memory issues.  --SKIN: Lumbar region is intact visually.   --RESPIRATORY: Normal rhythm and effort. No abnormal accessory muscle breathing patterns noted.   --MUSCULOSKELETAL:  Normal lumbar lordosis noted, no lateral shift on visualization.  --GROSS MOTOR:  Easily arises from a seated position. Gait is non-antalgic  --LUMBAR SPINE:  Inspection reveals no evidence of deformity. Range of motion is not limited in lumbar flexion, extension, lateral rotation.   --LOWER EXTREMITY MOTOR TESTING:   Full and equal bilateral active range of motion with knee extension/flexion and ankle dorsiflexion/extension.       RESULTS:   Imaging: Lumbar spine imaging was reviewed today. The images were shown to the patient and the findings were explained using a spine model.      Lumbar MRI   6/16/2015  CONCLUSION:   1. At L3-L4 there is a small to moderate-sized left paracentral disc extrusion extending into the left lateral recess, contributing to moderate left lateral recess stenosis and possible impingement of the traversing left L4 nerve root.   2. At L4-L5 there is mild right lateral recess stenosis secondary to a shallow right paracentral disc protrusion with annular fissure. Mild to moderate right and mild left foraminal stenoses secondary to a diffuse annular bulge with osteophytic ridging.   3. Moderate Modic type I changes at the L3-L4 level.

## 2021-06-07 NOTE — PATIENT INSTRUCTIONS - HE
Discussed the importance of core strengthening, ROM, stretching exercises with the patient and how each of these entities is important in decreasing pain.  Explained to the patient that the purpose of physical therapy is to teach the patient a home exercise program.  These exercises need to be performed every day in order to decrease pain and prevent future occurrences of pain.        ~Please call Nurse Navigation line [phone] with any questions or concerns about your treatment plan, if symptoms worsen and you would like to be seen urgently, or if you have problems controlling bladder and bowel function.    Discussed the risks (eg, addiction, overdose, worsening pain) verses benefit of opioid use with patient today. Explained that this medication will not be a long term solution to ongoing pain. Discussed using lowest effective dose and the importance of other measures for pain management including PT, other non-opioid medications, behavioral treatments, and other procedure options.     For any further refills on opioid pain medication you must come in to the clinic in person to discuss further in which you may or may not receive refills.

## 2021-06-07 NOTE — TELEPHONE ENCOUNTER
Last appointment: 01/10/2020  Next appointment: None    Notes/Comments: Janneth's patient      Rx request(s) has been reviewed. Rx(s) cued up for auth, to be e-scribed to pharm. Thanks.

## 2021-06-08 NOTE — PROGRESS NOTES
F/U  --Slipped on ice 2 weeks ago and having increase pain (recurrent pain)  --Today C/O B/L low back pain, radiates up the right thoracic and down the right buttock and right posterior thigh  --Rates pain 8/10  --[facility] x 10 sessions [facility] 9/20/16 for back    Medication  --Flexeril 5 mg 1-2 tab QHS PRN  --Stopped Ibuprofen and taking Aleve PRN OTC instead  --Gabapentin 300 mg decreased to 1 tab QHS per pt  --Want refill on Percocet and Prednisone

## 2021-06-08 NOTE — PROGRESS NOTES
Assessment:     Diagnoses and all orders for this visit:    Lumbar disc herniation  -     predniSONE (DELTASONE) 10 mg tablet pack; Take by mouth daily. Three 10 mg tablets daily for 2 days, then two 10 mg tablets for 2 days, then one 10 mg tablet for 2 days.  Dispense: 12 tablet; Refill: 0  -     oxyCODONE-acetaminophen (PERCOCET) 5-325 mg per tablet; Take 1 tablet by mouth daily as needed for pain (Severe pain only).  Dispense: 14 tablet; Refill: 0    Right lumbar radiculitis  -     predniSONE (DELTASONE) 10 mg tablet pack; Take by mouth daily. Three 10 mg tablets daily for 2 days, then two 10 mg tablets for 2 days, then one 10 mg tablet for 2 days.  Dispense: 12 tablet; Refill: 0    Right-sided low back pain with right-sided sciatica  -     oxyCODONE-acetaminophen (PERCOCET) 5-325 mg per tablet; Take 1 tablet by mouth daily as needed for pain (Severe pain only).  Dispense: 14 tablet; Refill: 0       Nathalie Erwin is a 42 y.o. y.o. female with past medical history significant for current smoker who presents today for follow-up regarding recurrent bilateral low back pain with right radiculitis that had been doing quite well however she had a fall 2 weeks ago that really aggravated her pain.  She denies any new symptoms at this time, is neurologically intact on exam.    Previous lumbar MRI reveals right lateral recess stenosis L4-5 due to right paracentral disc protrusion and annular fissure with moderate foraminal stenosis that is still likely contribute to her acute on chronic flare up of her radicular pain at this time.     Plan:     A shared decision making plan was used. The patient's values and choices were respected. Prior medical records from 9/20/16 were reviewed today. The following represents what was discussed and decided upon by the provider and the patient.        -DIAGNOSTIC TESTS: Reviewed lumbar spine MRI with patient today.  No need for further imaging at this time.    -MEDICATIONS: Prescribed  Medrol Dosepak today.   -Also prescribed Percocet 5/325 mg, 1 tablet daily as needed for severe breakthrough pain, #14 given for 2 weeks worth.  Patient's last prescription of this was September 2016 for 30 tablets.  MN  checked. Discussed the risks (eg, addiction, overdose, worsening pain) verses benefit of opioid use with patient today. Explained that this medication will not be a long term solution to ongoing pain. Discussed using lowest effective dose and the importance of other measures for pain management including PT, other non-opioid medications, behavioral treatments, and other procedure options.   Discussed side effects of medications and proper use. Patient verbalized understanding.    -PHYSICAL THERAPY: Advised patient to continue home exercise program which she is diligent with currently.  Discussed the importance of core strengthening, ROM, stretching exercises with the patient and how each of these entities is important in decreasing pain.  Explained to the patient that the purpose of physical therapy is to teach the patient a home exercise program.  These exercises need to be performed every day in order to decrease pain and prevent future occurrences of pain.        -PATIENT EDUCATION:  25 minutes of total visit time was spent face to face with the patient today, 60 % of the visit was spent on counseling, education, and coordinating care.     -FOLLOW UP: Follow-up if medications are not giving benefit in 2 weeks time.   Advised to contact clinic if symptoms worsen or change.    Subjective:     Nathalie Erwin is a 42 y.o. female who presents today for follow-up regarding acute on chronic flareup of bilateral low back pain and right posterior buttock pain as well as occasional pain into the right leg which is the same as her chronic pain pattern again in which she had been doing quite well however recently fell and slipped on ice about 2 weeks ago and her pain is been more significant since.    She  denies any weakness, denies any numbness or tingling, denies bowel or bladder dysfunction.  Denies any new symptoms today.    Treatment to Date:   Physical therapy in August 2015 with [facility] in [place] with some relief.  Physical therapy [facility] program [facility] 12 sessions last 9/20/2016 with minimal to no relief.  Patient continues to do daily home exercise program.    Medications:  Percocet 5/325, one half tablet in the morning one half tablet at nighttime that patient takes with ibuprofen which has given her significant relief.  Gabapentin with minimal relief, not currently taking.   Flexeril and ibuprofen with good relief. Percocet for severe pain at night.   Medrol Dosepak 9/20/2016 with significant relief of her symptoms, therefore canceled her lumbar CATARINA.     Current Outpatient Prescriptions on File Prior to Encounter   Medication Sig Dispense Refill     cyclobenzaprine (FLEXERIL) 5 MG tablet Take 1-2 tablets (5-10 mg total) by mouth bedtime as needed for muscle spasms. 60 tablet 2     DOCOSAHEXANOIC ACID/EPA (FISH OIL ORAL) Take 1 tablet by mouth daily.       FLAXSEED OIL ORAL Take 1 tablet by mouth daily.       ibuprofen (ADVIL,MOTRIN) 800 MG tablet Take 1 tablet (800 mg total) by mouth every 8 (eight) hours as needed for pain. 30 tablet 3     diazepam (VALIUM) 5 MG tablet 5 mg tablets, take 2 tablets, 2 hours prior to MRI or Procedure. 2 tablet 0     gabapentin (NEURONTIN) 300 MG capsule Take 3 capsules (900 mg total) by mouth 3 (three) times a day. Follow Gabapentin Dosing chart given 270 capsule 3     oxyCODONE-acetaminophen (PERCOCET) 5-325 mg per tablet Take 1 tablet by mouth daily as needed for pain (Severe pain only). 30 tablet 0     predniSONE (DELTASONE) 10 mg tablet pack Take by mouth daily. Three 10 mg tablets daily for 2 days, then two 10 mg tablets for 2 days, then one 10 mg tablet for 2 days. 12 tablet 0     No current facility-administered medications on file  prior to encounter.        Allergies   Allergen Reactions     Shellfish Containing Products      Crab leg made her face swell up x 10 years ago. Able to eat shrimp and lobster with no reaction.        No past medical history on file.     Review of Systems  ROS:   Specifically negative for bowel/bladder dysfunction, balance changes, headache, dizziness, foot drop, fevers, chills, appetite changes, nausea/vomiting, unexplained weight loss. Otherwise 13 systems reviewed are negative. Please see the patient's intake questionnaire from today for details.    Reviewed Social, Family, Past Medical and Past Surgical history with patient, no significant changes noted since prior visit.     Objective:     Visit Vitals     /43 (Patient Site: Left Arm, Patient Position: Sitting)     Pulse 91     Temp 98.6  F (37  C) (Oral)     Wt 143 lb (64.9 kg)     SpO2 97%     BMI 23.8 kg/m2       PHYSICAL EXAMINATION:    --CONSTITUTIONAL: Well developed, well nourished, healthy appearing individual.  --PSYCHIATRIC: Appropriate mood and affect. No difficulty interacting due to temper, social withdrawal, or memory issues.  --SKIN: Lumbar region is dry and intact. Sensation to light touch is intact in the bilateral L4, L5, and S1 dermatomes.  --RESPIRATORY: Normal rhythm and effort. No abnormal accessory muscle breathing patterns noted.   --MUSCULOSKELETAL:  Normal lumbar lordosis noted, no lateral shift.  --GROSS MOTOR: Easily arises from a seated position.   --LUMBAR SPINE:  Inspection reveals no evidence of deformity. Range of motion is not limited in lumbar flexion, extension, or lateral rotation. No tenderness to palpation. Straight leg raising in the supine position is negative to radicular pain. Sciatic notch non-tender.   --LOWER EXTREMITY MOTOR TESTING:  Plantar flexion left 5/5, right 5/5   Dorsiflexion left 5/5, right 5/5   Great toe MTP extension left 5/5, right 5/5  Knee flexion left 5/5, right 5/5  Knee extension left 5/5,  right 5/5   Hip flexion left 5/5, right 5/5  Hip abduction left 5/5, right 5/5  Hip adduction left 5/5, right 5/5   --HIPS: Full range of motion bilaterally. Negative FABERs on the involved lower extremity.   --NEUROLOGIC: Bilateral patellar and achilles reflexes are physiologic and symmetric. Lower extremities are intact to light touch.     RESULTS:   Imaging: MRI of the lumbar spine was reviewed today. The images were shown to the patient and the findings were explained using a spine model.    Lumbar MRI 6/16/2015  CONCLUSION: 1. At L3-L4 there is a small to moderate-sized left paracentral disc extrusion extending into the left lateral recess, contributing to moderate left lateral recess stenosis and possible impingement of the traversing left L4 nerve root. 2. At L4-L5 there is mild right lateral recess stenosis secondary to a shallow right paracentral disc protrusion with annular fissure. Mild to moderate right and mild left foraminal stenoses secondary to a diffuse annular bulge with osteophytic ridging. 3. Moderate Modic type I changes at the L3-L4 level.

## 2021-06-13 NOTE — PROGRESS NOTES
"Nathalie Erwin is a 45 y.o. female who is being evaluated via a billable video visit.      The patient has been notified of following:     \"This video visit will be conducted via a call between you and your physician/provider. We have found that certain health care needs can be provided without the need for an in-person physical exam.  This service lets us provide the care you need with a video conversation.  If a prescription is necessary we can send it directly to your pharmacy.  If lab work is needed we can place an order for that and you can then stop by our lab to have the test done at a later time.    Video visits are billed at different rates depending on your insurance coverage. Please reach out to your insurance provider with any questions.    If during the course of the call the physician/provider feels a video visit is not appropriate, you will not be charged for this service.\"    Patient has given verbal consent to a Video visit? Yes  How would you like to obtain your AVS? AVS Preference: MyChart.        Video Start Time: 1:30 PM    Video-Visit Details    Type of service:  Video Visit    Video End Time (time video stopped): 1:45 PM  Originating Location (pt. Location): Home    Distant Location (provider location):  [facility]    Platform used for Video Visit: Libra Willett CNP    Assessment:     Diagnoses and all orders for this visit:    Chronic right-sided low back pain with right-sided sciatica  -     methylPREDNISolone (MEDROL, DEEPTI,) 4 mg tablet; 3 tablets daily for 3 days, then 2 tablets daily for 3 days, then 1 tablet daily for 3 days then stop  Dispense: 18 tablet; Refill: 0  -     oxyCODONE-acetaminophen (PERCOCET/ENDOCET) 5-325 mg per tablet; Take 1 tablet by mouth daily as needed for pain (Severe breakthrough pain).  Dispense: 14 tablet; Refill: 0    Right lumbar radiculitis  -     methylPREDNISolone (MEDROL, DEEPTI,) 4 mg tablet; 3 tablets daily " for 3 days, then 2 tablets daily for 3 days, then 1 tablet daily for 3 days then stop  Dispense: 18 tablet; Refill: 0  -     oxyCODONE-acetaminophen (PERCOCET/ENDOCET) 5-325 mg per tablet; Take 1 tablet by mouth daily as needed for pain (Severe breakthrough pain).  Dispense: 14 tablet; Refill: 0    Lumbar disc herniation  -     methylPREDNISolone (MEDROL, DEEPTI,) 4 mg tablet; 3 tablets daily for 3 days, then 2 tablets daily for 3 days, then 1 tablet daily for 3 days then stop  Dispense: 18 tablet; Refill: 0    Myofascial pain       Nathalie Erwin is a 45 y.o. y.o. female with past medical history significant for current smoker, who presents today for follow-up regarding:    -Acute flareup of chronic right-sided low back pain that radiates to the right buttock with paresthesias right lower extremity, previous significant relief with Medrol Dosepak last in May 2020.       Plan:     A shared decision making plan was used. The patient's values and choices were respected. Prior medical records from 5/5/2020 to current were reviewed today. The following represents what was discussed and decided upon by the provider and the patient.        -DIAGNOSTIC TESTS: Images were personally reviewed and interpreted.   --Did discuss with patient that if no benefit with Medrol Dosepak or more frequent flareups more than twice a year I would recommend updated lumbar spine MRI however she has been doing quite well up until recent flare therefore can hold off at this time.  Patient denies lower extremity weakness.  --Previous lumbar MRI 2015 reveals right lateral recess stenosis L4-5 due to right paracentral disc protrusion and annular fissure with moderate foraminal stenosis that is still likely contribute to her acute on chronic flare up of her radicular pain at this time.    -MEDICATIONS: Refilled Medrol Dosepak today for acute pain flareup.  -Also prescribed Percocet 5/325 mg 1 tablet daily as needed for severe breakthrough pain number  14 tablets given for 2 weeks worth.  Patient is aware that this is for acute pain only and not a good long-term solution to ongoing pain if no benefit with Medrol Dosepak.  MN  checked. Discussed the risks (eg, addiction, overdose, worsening pain) verses benefit of opioid use with patient today. Explained that this medication will not be a long term solution to ongoing pain. Discussed using lowest effective dose and the importance of other measures for pain management including PT, other non-opioid medications, behavioral treatments, and other procedure options.   Discussed side effects of medications and proper use. Patient verbalized understanding.    -PHYSICAL THERAPY: Encouraged patient to continue with home exercises from prior physical therapy sessions which she is doing diligently on a regular basis as well as regular hallie chi with benefit.  Discussed the importance of core strengthening, ROM, stretching exercises with the patient and how each of these entities is important in decreasing pain.  Explained to the patient that the purpose of physical therapy is to teach the patient a home exercise program.  These exercises need to be performed every day in order to decrease pain and prevent future occurrences of pain.        -PATIENT EDUCATION:  15 minutes of total visit time was spent face to face with the patient today, 60 % of the visit was spent on counseling, education, and coordinating care.   -5 minutes spent outside of visit time, non-face-to-face time, reviewing chart.  -Today we also discussed the issues related to the current COVID-19 pandemic, the pros and cons of the current treatment plan, the CDC guidelines such as social distancing, washing the hands, and covering the cough.    -FOLLOW UP: Follow-up if symptoms or not improving with Medrol Dosepak.  Advised to contact clinic if symptoms worsen or change.    Subjective:     Nathalie Erwin is a 45 y.o. female who presents today for follow-up  regarding chronic recurrent right low back pain at the right lumbosacral junction that has been fairly significant for the last 4 days that radiates into the right buttock with numbness and tingling recurrent in the right lower extremity.  Currently her pain is a constant 7/10, 2 at its best.  Patient reports that she had done really well after Medrol Dosepak that was given in May 2020 up until recent.  She does report that she moved the wrong way and felt her back tweak and it pain is been more severe since then.  Patient denies lower extremity weakness or episodes of her legs giving out on her, denies any new symptoms and she is hopeful for repeat Medrol Dosepak as she gets this no more than a couple of times a year and it dramatically improves her symptoms.  Patient denies bowel or bladder loss control.    Treatment to Date:   Physical therapy in August 2015 with [facility] in [place] with some relief.  Physical therapy [facility] program [facility] 12 sessions last 9/20/2016 with minimal to no relief.  Patient continues to do daily home exercise program.      Medications:  Gabapentin 600 mg HS  Flexeril and ibuprofen with good relief.   Medrol Dosepak 9/20/2016 and 2/6/17 with significant relief.   Methocarbamol with some benefit but she feels Flexeril is better at at bedtime.  Medrol Dosepak prescribed 9/24/2019, 1/10/2020, and 5/5/2020 with significant benefit.     Percocet for severe pain at night, takes very infrequently.    Current Outpatient Medications on File Prior to Encounter   Medication Sig Dispense Refill     cyclobenzaprine (FLEXERIL) 5 MG tablet TAKE 1-2 TABLET BY MOUTH EVERY NIGHT AT BEDTIME AS NEEDED FOR MUSCLE SPASMS 60 tablet 0     gabapentin (NEURONTIN) 300 MG capsule TAKE 3 CAPSULES BY MOUTH THREE TIMES DAILY 270 capsule 3     ibuprofen (ADVIL,MOTRIN) 600 MG tablet TAKE 1 TABLET BY MOUTH EVERY 8 HOURS WITH FOOD AS NEEDED FOR PAIN 90 tablet 0     DOCOSAHEXANOIC ACID/EPA  (FISH OIL ORAL) Take 1 tablet by mouth daily.       FLAXSEED OIL ORAL Take 1 tablet by mouth daily.       [DISCONTINUED] methylPREDNISolone (MEDROL, DEEPTI,) 4 mg tablet 3 tablets daily for 3 days, then 2 tablets daily for 3 days, then 1 tablet daily for 3 days then stop 18 tablet 0     [DISCONTINUED] predniSONE (DELTASONE) 10 mg tablet pack Take by mouth daily. Three 10 mg tablets daily for 3 days, then two 10 mg tablets for 3 days, then one 10 mg tablet for 3 days. 18 tablet 0     No current facility-administered medications on file prior to encounter.        Allergies   Allergen Reactions     Shellfish Containing Products      Crab leg made her face swell up x 10 years ago. Able to eat shrimp and lobster with no reaction.        No past medical history on file.     Review of Systems  ROS:  Specifically negative for bowel/bladder dysfunction, balance changes, headache, dizziness, foot drop, fevers, chills, appetite changes, nausea/vomiting, unexplained weight loss. Otherwise 13 systems reviewed are negative. Please see the patient's intake questionnaire from today for details.    Reviewed Social, Family, Past Medical and Past Surgical history with patient, no significant changes noted since prior visit.     Objective:     VIRTUAL PHYSICAL EXAM:   --CONSTITUTIONAL: Well developed, well nourished, healthy appearing individual.  --PSYCHIATRIC: Appropriate mood and affect. No difficulty interacting due to temper, social withdrawal, or memory issues.  --SKIN: Lumbar region is visually dry and intact.   --RESPIRATORY: Normal rhythm and effort. No abnormal accessory muscle breathing patterns noted.   --STANDING EXAMINATION:  Normal lumbar lordosis noted, no lateral shift.  --MUSCULOSKELETAL: Lumbar spine inspection reveals no evidence of deformity.     RESULTS:   Imaging: Lumbar spine imaging was reviewed today. The images were shown to the patient and the findings were explained using a spine model.      Lumbar MRI    6/16/2015  CONCLUSION:   1. At L3-L4 there is a small to moderate-sized left paracentral disc extrusion extending into the left lateral recess, contributing to moderate left lateral recess stenosis and possible impingement of the traversing left L4 nerve root.   2. At L4-L5 there is mild right lateral recess stenosis secondary to a shallow right paracentral disc protrusion with annular fissure. Mild to moderate right and mild left foraminal stenoses secondary to a diffuse annular bulge with osteophytic ridging.   3. Moderate Modic type I changes at the L3-L4 level.

## 2021-06-14 NOTE — PROGRESS NOTES
Assessment:     Diagnoses and all orders for this visit:    Lumbar disc herniation  -     oxyCODONE-acetaminophen (PERCOCET) 5-325 mg per tablet; Take 1 tablet by mouth every 8 (eight) hours as needed for pain.  Dispense: 42 tablet; Refill: 0  -     predniSONE (DELTASONE) 10 mg tablet pack; Take by mouth daily. Three 10 mg tablets daily for 3 days, then two 10 mg tablets for 3 days, then one 10 mg tablet for 3 days.  Dispense: 18 tablet; Refill: 0    Right lumbar radiculitis  -     oxyCODONE-acetaminophen (PERCOCET) 5-325 mg per tablet; Take 1 tablet by mouth every 8 (eight) hours as needed for pain.  Dispense: 42 tablet; Refill: 0  -     predniSONE (DELTASONE) 10 mg tablet pack; Take by mouth daily. Three 10 mg tablets daily for 3 days, then two 10 mg tablets for 3 days, then one 10 mg tablet for 3 days.  Dispense: 18 tablet; Refill: 0    Chronic right-sided low back pain with right-sided sciatica  -     oxyCODONE-acetaminophen (PERCOCET) 5-325 mg per tablet; Take 1 tablet by mouth every 8 (eight) hours as needed for pain.  Dispense: 42 tablet; Refill: 0  -     predniSONE (DELTASONE) 10 mg tablet pack; Take by mouth daily. Three 10 mg tablets daily for 3 days, then two 10 mg tablets for 3 days, then one 10 mg tablet for 3 days.  Dispense: 18 tablet; Refill: 0  -     ibuprofen (ADVIL,MOTRIN) 800 MG tablet; Take 1 tablet (800 mg total) by mouth every 8 (eight) hours as needed for pain.  Dispense: 42 tablet; Refill: 1     Nathalie Erwin is a 42 y.o. y.o. female with past medical history significant for current smoker who presents today for follow-up regarding acute flareup of chronic right low back pain and right lumbar radiculitis L5 dermatomal pattern ongoing ×1 week that is severe.    Patient is neurologically intact on exam and denies any new symptoms.  **She does not have current insurance until 1 January, 2018.     Plan:     A shared decision making plan was used. The patient's values and choices were  respected. Prior medical records from 2/6/17 were reviewed today. The following represents what was discussed and decided upon by the provider and the patient.        -DIAGNOSTIC TESTS: Images were personally reviewed and interpreted.   --Previous lumbar MRI reveals right lateral recess stenosis L4-5 due to right paracentral disc protrusion and annular fissure with moderate foraminal stenosis that is still likely contribute to her acute on chronic flare up of her radicular pain at this time.    -INTERVENTIONS: No recommendations for injections at this time and she would like to avoid injections.    -MEDICATIONS: Prescribed Medrol Dosepak which she did get relief from previously.  -Also refilled ibuprofen 800 mg 1 tablet 3 times daily as needed however advised patient to start this medication after completion of Medrol Dosepak.  -Prescribed Percocet 5/325 mg 1 tablet every 8 hours as needed for severe breakthrough pain, #42 tablets given for 14 days worth.  -MN  checked. Discussed the risks (eg, addiction, overdose, worsening pain) verses benefit of opioid use with patient today. Explained that this medication will not be a long term solution to ongoing pain. Discussed using lowest effective dose and the importance of other measures for pain management including PT, other non-opioid medications, behavioral treatments, and other procedure options.   Discussed side effects of medications and proper use. Patient verbalized understanding.    -PHYSICAL THERAPY: Did advise patient to continue home exercise program at this time which he does diligently.  Discussed the importance of core strengthening, ROM, stretching exercises with the patient and how each of these entities is important in decreasing pain.  Explained to the patient that the purpose of physical therapy is to teach the patient a home exercise program.  These exercises need to be performed every day in order to decrease pain and prevent future occurrences  of pain.        -PATIENT EDUCATION:  20 minutes of total visit time was spent face to face with the patient today, 60 % of the visit was spent on counseling, education, and coordinating care.   -5 minutes spent outside of visit time, non-face-to-face time, reviewing chart.    -FOLLOW UP: as needed if symptoms worsen or new symptoms arise.  Advised to contact clinic if symptoms worsen or change.    Subjective:     Nathalie Erwin is a 42 y.o. female who presents today for follow-up regarding acute flareup of chronic bilateral low back pain worse on the right that radiates to the right buttock right posterior and lateral thigh and posterior and lateral shin with associated perceived weakness that is again chronic in nature however worse in the last week with no known injury, currently her pain is an 8/10, a 4 to its best but up to an 8 at its worst.  She does report that bending and lying are more bothersome whereas sitting feels better.    He is hoping for a Medrol Dosepak as she has gotten relief with this previously, currently does not have insurance therefore wants to hold off any type of physical therapy or injection.  She does continue to do her home exercises on a regular basis.    Treatment to Date:   Physical therapy in August 2015 with [facility] in [place] with some relief.  Physical therapy [facility] program [facility] 12 sessions last 9/20/2016 with minimal to no relief.  Patient continues to do daily home exercise program.     Medications:  Gabapentin with minimal relief, not currently taking.   Flexeril and ibuprofen with good relief.   Percocet for severe pain at night.   Medrol Dosepak 9/20/2016 and 2/6/17 with significant relief.     Current Outpatient Prescriptions on File Prior to Encounter   Medication Sig Dispense Refill     cyclobenzaprine (FLEXERIL) 5 MG tablet Take 1-2 tablets (5-10 mg total) by mouth bedtime as needed for muscle spasms. 60 tablet 2     DOCOSAHEXANOIC  ACID/EPA (FISH OIL ORAL) Take 1 tablet by mouth daily.       FLAXSEED OIL ORAL Take 1 tablet by mouth daily.       gabapentin (NEURONTIN) 300 MG capsule Take 3 capsules (900 mg total) by mouth 3 (three) times a day. Follow Gabapentin Dosing chart given 270 capsule 3     [DISCONTINUED] ibuprofen (ADVIL,MOTRIN) 800 MG tablet TAKE 1 TABLET BY MOUTH EVERY 8 HOURS AS NEEDED FOR PAIN 30 tablet 1     predniSONE (DELTASONE) 10 mg tablet pack Take by mouth daily. Three 10 mg tablets daily for 2 days, then two 10 mg tablets for 2 days, then one 10 mg tablet for 2 days. 12 tablet 0     [DISCONTINUED] diazepam (VALIUM) 5 MG tablet 5 mg tablets, take 2 tablets, 2 hours prior to MRI or Procedure. 2 tablet 0     No current facility-administered medications on file prior to encounter.        Allergies   Allergen Reactions     Shellfish Containing Products      Crab leg made her face swell up x 10 years ago. Able to eat shrimp and lobster with no reaction.        No past medical history on file.     Review of Systems  ROS: Positive for numbness and tingling, chronic right lower extremity weakness.  Specifically negative for bowel/bladder dysfunction, balance changes, headache, dizziness, foot drop, fevers, chills, appetite changes, nausea/vomiting, unexplained weight loss. Otherwise 13 systems reviewed are negative. Please see the patient's intake questionnaire from today for details.    Reviewed Social, Family, Past Medical and Past Surgical history with patient, no significant changes noted since prior visit.     Objective:     /73 (Patient Site: Left Arm, Patient Position: Sitting)  Pulse 89  Wt 158 lb (71.7 kg)  SpO2 97%  BMI 26.29 kg/m2    PHYSICAL EXAMINATION:    --CONSTITUTIONAL: Well developed, well nourished, healthy appearing individual.  --PSYCHIATRIC: Appropriate mood and affect. No difficulty interacting due to temper, social withdrawal, or memory issues.  --SKIN: Lumbar region is dry and intact. Sensation to  light touch is intact in the bilateral L4, L5, and S1 dermatomes.  --RESPIRATORY: Normal rhythm and effort. No abnormal accessory muscle breathing patterns noted.   --MUSCULOSKELETAL:  Normal lumbar lordosis noted, no lateral shift.  --GROSS MOTOR: Easily arises from a seated position.   --LUMBAR SPINE:  Inspection reveals no evidence of deformity. Range of motion is limited in lumbar flexion, extension, or lateral rotation. No tenderness to palpation. Straight leg raising in the supine position is negative to radicular pain. Sciatic notch non-tender.   --LOWER EXTREMITY MOTOR TESTING:  Plantar flexion left 5/5, right 5/5   Dorsiflexion left 5/5, right 5/5   Great toe MTP extension left 5/5, right 5/5  Knee flexion left 5/5, right 5/5  Knee extension left 5/5, right 5/5   Hip flexion left 5/5, right 5/5  Hip abduction left 5/5, right 5/5  Hip adduction left 5/5, right 5/5   --HIPS: Full range of motion bilaterally. Negative FABERs on the involved lower extremity.   --NEUROLOGIC: Bilateral patellar and achilles reflexes are physiologic and symmetric. Lower extremities are intact to light touch.     RESULTS:   Imaging: Lumbar spine imaging was reviewed today. The images were shown to the patient and the findings were explained using a spine model.      Lumbar MRI - 6/16/2015  CONCLUSION:   1. At L3-L4 there is a small to moderate-sized left paracentral disc extrusion extending into the left lateral recess, contributing to moderate left lateral recess stenosis and possible impingement of the traversing left L4 nerve root.   2. At L4-L5 there is mild right lateral recess stenosis secondary to a shallow right paracentral disc protrusion with annular fissure. Mild to moderate right and mild left foraminal stenoses secondary to a diffuse annular bulge with osteophytic ridging.   3. Moderate Modic type I changes at the L3-L4 level.

## 2021-06-14 NOTE — PROGRESS NOTES
F/U  --C/O B/L low back, right thoracic, right buttock, right posterior leg to the calf that flared up 11/13/17  --Rates pain 8/10  --Want to get refill on meds  --Medx x 10 sessions [facility], 9/20/16    Medication  --Flexeril 5 mg PRN  --Gabapentin 300 mg 2 caps QHS  --Ibuprofen 800 mg 1 tab Q8H PRN, want refill  --Want new Rx for Prednisone pack and pain medication

## 2021-06-16 NOTE — PROGRESS NOTES
Assessment:   Nathalie Erwin is a 43 y.o. y.o. female past medical history significant for nicotine dependence today for follow-up regarding chronic right greater than left low back pain with radiation into the right lower extremity with associated numbness and tingling.  Most recent flareup of pain occurred in November 2017.  Pain has been slowly improving over the past 4 months, she still has significant pain.  MRI of the lumbar spine shows a right L4-5 disc protrusion which results in slight right lateral recess stenosis where she may be trapping the right L5 nerve root.  Patient symptoms follow a right L5 distribution.  The patient a sensory deficit in the right lateral calf on exam, but was otherwise neurologically intact.  Patient's pain has been managed with Percocet, NSAIDs, gabapentin, cyclobenzaprine, and intermittent use of oral steroids for flareups.  She returns today for refill of her Percocet.  She also wants to try different muscle relaxant.       Plan:     A shared decision making plan was used.  The patient's values and choices were respected.  The following represents what was discussed and decided upon by the physician assistant and the patient.      1.  DIAGNOSTIC TESTS: I reviewed the MRI lumbar spine from 2015.  No further diagnostic tests were ordered.    2.  PHYSICAL THERAPY: The patient participated in the [facility] digital physical therapy program, ending in September 2016.  I offered to refer the patient back to physical therapy.  She declined.  I did encourage her to do her home exercises.    3.  MEDICATIONS:    -Oxycodone/acetaminophen 5/325 mg 1 tab every 8 hours as needed, #20 with no refills was prescribed.  I checked [place] prescription monitoring database.  Her most recent opiate prescription was on November 20, 2017 for 42 tabs of Percocet.  I reviewed the risks and benefits of this medication with the patient.  I told the patient that I would not recommend utilizing Percocet long-term.  I  strongly encouraged her to consider additional treatment options if she is continuing to need Percocet in the future, such as epidural steroid injections.  -I also prescribed methocarbamol 500-1000 mg 3 times daily as needed.  I asked her to use this in place of cyclobenzaprine.  -The patient can continue using ibuprofen 800 mg as needed.  -The patient can continue using gabapentin 600 mg at bedtime as needed.  -The patient does not feel that her pain is severe enough at this point to warrant a repeat Medrol Dosepak.  If her pain were to flare back up, recommend a repeat Medrol Dosepak.  She has responded well to this in the past.    4.  INTERVENTIONS: The patient I discussed possibly trying an epidural steroid injection.  I do think she could benefit from a right L4-5, L5-S1 transforaminal epidural steroid injection.  The patient is concerned about the risks of the procedure.  I did educate her on the risks of the procedure.  I explained that it is a very safe procedure.  The patient is not interested in pursuing interventional pain management at this point.    5.  REFERRALS:    -The patient asked that he should get a referral to the pain clinic.  I explained to the patient that I would defer to Janneth as to whether or not she needs a referral to pain clinic.  I did explain to the patient that we offer most of the same treatments as the [facility] pain clinic.  The patient was comfortable holding off on a pain clinic referral at this time.  -I did refer the patient for acupuncture, which the patient is interested in trying.  I recommended that she check with her insurance before proceeding with this treatment.  -I offered referral to behavioral health for her chronic pain.  She declined.    6.  FOLLOW-UP: I offered to schedule a follow-up for the patient with Janneth once she gets back from vacation.  The patient declined.  She prefers to follow-up as needed.  She has any questions or concerns in the future, she  "should not hesitate to contact our clinic.    Subjective:     Nathalie Erwin is a 43 y.o. female who presents today for follow-up regarding chronic right greater than left low back pain with radiation into the right lower extremity with associated numbness and tingling.  This is a patient of Janneth Willett CNP.  The patient last saw Janneth on November 20, 2017.  At that time she was having a flareup of her chronic pain.  Janneth prescribed prednisone, ibuprofen, and Percocet.  The patient reports that the prednisone by the significant relief of her pain.  She has continued to use the Percocet as needed.  The patient states that since November, her pain has improved, but she still has a significant amount of pain and it does interfere with her function.  She returns today because she would like a refill of her Percocet.    The patient complains of right-sided low back pain.  The pain radiates into the right buttock and into the proximal posterior lateral right thigh.  The pain also radiates across low back to the left side.  The patient states that she has numbness and tingling which extends down the right posterior lateral thigh and into the lateral calf.  It does not reach the foot.  The patient rates her pain today as a 7 out of 10.  At its best it is a 3 out of 10.  At its worst it is a 10 out of 10.  The patient's pain is aggravated with sitting, trying to sleep at night, rolling over in bed.  It tends to be severe in the morning.  The patient also states that she has more pain if she stands up straight.  She states that she feels \"stuck\" in this position.  She states that this lack of range of motion is frustrating for her.  She has less pain if she stands in a flexed forward posture at the hips, with knees flexed.  Walking and resting also help to alleviate her pain.  The patient states that she has chronic weakness in the right ankle which is unchanged.  She denies any new weakness.    The patient " participated in the [facility] program in 2016, ending in September 2016.  She states that she does her home exercises.  The patient has been using Percocet 1-2 tabs at bedtime.  She requests a refill.  She uses ibuprofen 800 mg as needed.  Uses gabapentin 600 mg at bedtime as needed.  She also uses cyclobenzaprine 5-10 mg daily.  She would like to try different muscle relaxant.  She is not sure if the cyclobenzaprine is helping and she does not like the associated drowsiness.    Past medical history is reviewed and is unchanged in the interim.    Family history is reviewed and is unchanged in the interim.    Review of Systems:  Positive for numbness/tingling, footdrop, weakness.  Negative for loss of bowel/bladder control, headache, dizziness, nausea/vomiting, blurry vision, balance changes.     Objective:   CONSTITUTIONAL:  Vital signs as above.  No acute distress.  The patient is well nourished and well groomed.    PSYCHIATRIC:  The patient is awake, alert, oriented to person, place and time.  The patient is answering questions appropriately with clear speech.  Normal affect.  HEENT: Normocephalic, atraumatic.  Sclera clear.    SKIN:  Skin over the face, posterior torso, bilateral upper and lower extremities is clean, dry, intact without rashes.  MUSCULOSKELETAL:  Gait is non-antalgic.  The patient does ambulate in a slightly flexed forward posture at the hips.  No significant tenderness over the bilateral lower lumbar paraspinal muscles.   No significant tenderness palpation of the sacroiliac joints.  The patient does have some hypertonicity in the right lower lumbar paraspinous muscles.  Lumbar flexion is moderately restricted.  Lumbar extension is mildly restricted.  The patient has 5/5 strength for the bilateral hip flexors, knee flexors/extensors, ankle dorsiflexors/plantar flexors, ankle evertors/invertors.  Negative straight leg raise on the right.  Negative pelvic distraction test.  Negative  thigh thrust on the right.  Negative Boogie's test on the right.  NEUROLOGICAL: 2+ subjective diminished/altered sensation in the right lateral calf.  Patellar, achilles reflexes which are symmetric bilaterally.  No ankle clonus bilaterally.      RESULTS: I reviewed the MRI lumbar spine from [facility] dated June 16, 2015.  At L3-4 there is a small to moderate left paracentral disc extrusion extending into the left lateral recess contributing to moderate left lateral recess stenosis and possible impingement of the left L4 nerve root.  At L4-5 there is mild right lateral recess stenosis secondary to a shallow right paracentral protrusion with annular fissure.  There is also mild to moderate right and mild left foraminal stenosis at this level secondary to a diffuse annular bulge with osteophytic ridging.  Please see report for further details.

## 2021-06-18 NOTE — PROGRESS NOTES
Assessment:     Diagnoses and all orders for this visit:    Chronic right-sided low back pain with right-sided sciatica  -     methocarbamol (ROBAXIN) 500 MG tablet; Take 1-2 tablets (500-1,000 mg total) by mouth 3 (three) times a day as needed.  Dispense: 60 tablet; Refill: 0    Lumbar radiculitis  -     oxyCODONE-acetaminophen (PERCOCET) 5-325 mg per tablet; Take 1 tablet by mouth every 8 (eight) hours as needed for pain (Severe Pain).  Dispense: 30 tablet; Refill: 0  -     methocarbamol (ROBAXIN) 500 MG tablet; Take 1-2 tablets (500-1,000 mg total) by mouth 3 (three) times a day as needed.  Dispense: 60 tablet; Refill: 0    Lumbar disc herniation       Nathalie Erwin is a 43 y.o. y.o. female with past medical history significant for current smoker, who presents today for follow-up regarding:    -Chronic right-sided low back pain with right lumbar radiculitis L5 dermatomal pattern with intermittent flareups that usually last 3-4 days.    Patient is neurologically intact on exam.     Plan:     A shared decision making plan was used. The patient's values and choices were respected. Prior medical records from 11/20/17 to current were reviewed today. The following represents what was discussed and decided upon by the provider and the patient.        -DIAGNOSTIC TESTS: Images were personally reviewed and interpreted.   --Previous lumbar MRI 2015 reveals right lateral recess stenosis L4-5 due to right paracentral disc protrusion and annular fissure with moderate foraminal stenosis that is still likely contribute to her acute on chronic flare up of her radicular pain at this time.    -INTERVENTIONS: No recommendations for injections at this time.    -MEDICATIONS: Did prescribe methocarbamol 500 mg 1-2 tablets 3 times daily as needed for myofascial pain more so for pain flareups but she does take this occasionally at nighttime to help with sleep due to the discomfort.  -Also prescribed Percocet 5/325 mg 1 tablet every 8  hours as needed for severe breakthrough pain, #30 tablets given.  Which she only takes it during her flares which again have been a couple of times a month usually lasting 3-4 days at the most.  I do not recommend her taking this on a regular basis which she is not interested in doing.  We did discuss her seeing the pain clinic down the road if she is taking it more regularly, however appropriate to refill at this time as she is taking it very infrequently.  [place]  checked. Discussed the risks (eg, addiction, overdose, worsening pain) verses benefit of opioid use with patient today. Explained that this medication will not be a long term solution to ongoing pain. Discussed using lowest effective dose and the importance of other measures for pain management including PT, other non-opioid medications, behavioral treatments, and other procedure options.   Discussed side effects of medications and proper use. Patient verbalized understanding.    -PHYSICAL THERAPY: Did discuss revisiting physical therapy.  She is diligent about doing her home exercises and has a high co-pay therefore would like to hold off as she is doing well.  Did discuss if her pain flares become more frequent I would recommend revisiting the [facility] program.  Discussed the importance of core strengthening, ROM, stretching exercises with the patient and how each of these entities is important in decreasing pain.  Explained to the patient that the purpose of physical therapy is to teach the patient a home exercise program.  These exercises need to be performed every day in order to decrease pain and prevent future occurrences of pain.        -PATIENT EDUCATION:  25 minutes of total visit time was spent face to face with the patient today, 60 % of the visit was spent on counseling, education, and coordinating care.   -5 minutes spent outside of visit time, non-face-to-face time, reviewing chart.    -FOLLOW UP:Follow-up as needed.  Advised to contact clinic  "if symptoms worsen or change.    Subjective:     Nathalie Erwin is a 43 y.o. female who presents today for follow-up regarding chronic bilateral low back pain that is her most biggest concern with some radicular symptoms, pain into the right buttock posterior thigh lateral thigh and posterior lateral calf that is intermittent.  Currently her pain is a 4/10 today, 2 to its best but up to an 8 at its worst.  She reports that is tolerable at this time but she wants to talk about medication refill as she does get flareups where she feels her back \"locks up\" and she usually has increased pain for about 3-4 days but then usually with ibuprofen regularly, muscle relaxants and occasionally Percocet she does feel like it usually returns back to her baseline.  She is doing her home exercises diligently at this time, denies any new symptoms.    Treatment to Date:   Physical therapy in August 2015 with [facility] in [place] with some relief.  Physical therapy [facility] program [facility] 12 sessions last 9/20/2016 with minimal to no relief.  Patient continues to do daily home exercise program.      Medications:  Gabapentin 600 mg HS  Flexeril and ibuprofen with good relief.   Percocet for severe pain at night.   Medrol Dosepak 9/20/2016 and 2/6/17 with significant relief.   Methocarbamol    Current Outpatient Prescriptions on File Prior to Encounter   Medication Sig Dispense Refill     cyclobenzaprine (FLEXERIL) 5 MG tablet Take 1-2 tablets (5-10 mg total) by mouth bedtime as needed for muscle spasms. 60 tablet 2     DOCOSAHEXANOIC ACID/EPA (FISH OIL ORAL) Take 1 tablet by mouth daily.       FLAXSEED OIL ORAL Take 1 tablet by mouth daily.       gabapentin (NEURONTIN) 300 MG capsule Take 3 capsules (900 mg total) by mouth 3 (three) times a day. Follow Gabapentin Dosing chart given 270 capsule 3     [DISCONTINUED] oxyCODONE-acetaminophen (PERCOCET) 5-325 mg per tablet Take 1 tablet by mouth every 8 (eight) " hours as needed for pain. 20 tablet 0     No current facility-administered medications on file prior to encounter.        Allergies   Allergen Reactions     Shellfish Containing Products      Crab leg made her face swell up x 10 years ago. Able to eat shrimp and lobster with no reaction.        No past medical history on file.     Review of Systems  ROS: Positive for numbness and tingling.  Specifically negative for bowel/bladder dysfunction, balance changes, headache, dizziness, foot drop, fevers, chills, appetite changes, nausea/vomiting, unexplained weight loss. Otherwise 13 systems reviewed are negative. Please see the patient's intake questionnaire from today for details.    Reviewed Social, Family, Past Medical and Past Surgical history with patient, no significant changes noted since prior visit.     Objective:     /71 (Patient Site: Left Arm, Patient Position: Sitting)  Pulse 89  Wt 161 lb (73 kg)  SpO2 98%  BMI 26.79 kg/m2    PHYSICAL EXAMINATION:    --CONSTITUTIONAL: Well developed, well nourished, healthy appearing individual.  --PSYCHIATRIC: Appropriate mood and affect. No difficulty interacting due to temper, social withdrawal, or memory issues.  --SKIN: Lumbar region is dry and intact.   --RESPIRATORY: Normal rhythm and effort. No abnormal accessory muscle breathing patterns noted.   --MUSCULOSKELETAL:  Normal lumbar lordosis noted, no lateral shift.  --GROSS MOTOR: Easily arises from a seated position. Gait is non-antalgic  --LUMBAR SPINE:  Inspection reveals no evidence of deformity. Range of motion is not limited in lumbar flexion, extension, lateral rotation. No tenderness to palpation lumbar spine. Straight leg raising in the supine position is negative to radicular pain. Sciatic notch non-tender.   --LOWER EXTREMITY MOTOR TESTING:  Plantar flexion left 5/5, right 5/5   Dorsiflexion left 5/5, right 5/5   Great toe MTP extension left 5/5, right 5/5  Knee flexion left 5/5, right 5/5  Knee  extension left 5/5, right 5/5   Hip flexion left 5/5, right 5/5  Hip abduction left 5/5, right 5/5  Hip adduction left 5/5, right 5/5   --NEUROLOGIC: Bilateral patellar and achilles reflexes are physiologic and symmetric. Sensation to light touch is intact in the bilateral L4, L5, and S1 dermatomes.    RESULTS:   Imaging: Lumbar spine imaging was reviewed today. The images were shown to the patient and the findings were explained using a spine model.      Lumbar MRI   6/16/2015  CONCLUSION:   1. At L3-L4 there is a small to moderate-sized left paracentral disc extrusion extending into the left lateral recess, contributing to moderate left lateral recess stenosis and possible impingement of the traversing left L4 nerve root.   2. At L4-L5 there is mild right lateral recess stenosis secondary to a shallow right paracentral disc protrusion with annular fissure. Mild to moderate right and mild left foraminal stenoses secondary to a diffuse annular bulge with osteophytic ridging.   3. Moderate Modic type I changes at the L3-L4 level.

## 2021-06-18 NOTE — PROGRESS NOTES
F/U   --Refill on medications  --C/O right low back pan, radiates to the right buttock (no change)  --Continue to have numbness in the right posterior leg   --Rates pain 4/10  --[facility] x 10 sessions [facility] 9/20/16 for lumbar     Medication  --Ibuprofen 800 mg  --Flexeril 5 mg PRN, want refill  --Gabapentin 300 mg 2 cap at bedtime  --Percocet 5-325 mg PRN, want refill

## 2021-06-20 ENCOUNTER — COMMUNICATION - HEALTHEAST (OUTPATIENT)
Dept: PHYSICAL MEDICINE AND REHAB | Facility: CLINIC | Age: 46
End: 2021-06-20

## 2021-06-20 DIAGNOSIS — G89.29 CHRONIC RIGHT-SIDED LOW BACK PAIN WITH RIGHT-SIDED SCIATICA: ICD-10-CM

## 2021-06-20 DIAGNOSIS — M54.41 CHRONIC RIGHT-SIDED LOW BACK PAIN WITH RIGHT-SIDED SCIATICA: ICD-10-CM

## 2021-06-20 DIAGNOSIS — M54.16 RIGHT LUMBAR RADICULITIS: ICD-10-CM

## 2021-06-20 NOTE — PROGRESS NOTES
Assessment:     Diagnoses and all orders for this visit:    Chronic right-sided low back pain with right-sided sciatica  -     gabapentin (NEURONTIN) 300 MG capsule; Take 3 capsules (900 mg total) by mouth 3 (three) times a day. Follow Gabapentin Dosing chart given  Dispense: 270 capsule; Refill: 3  -     cyclobenzaprine (FLEXERIL) 5 MG tablet; Take 1-2 tablets (5-10 mg total) by mouth at bedtime as needed for muscle spasms.  Dispense: 60 tablet; Refill: 1    Right lumbar radiculitis  -     gabapentin (NEURONTIN) 300 MG capsule; Take 3 capsules (900 mg total) by mouth 3 (three) times a day. Follow Gabapentin Dosing chart given  Dispense: 270 capsule; Refill: 3    Lumbar disc herniation  -     cyclobenzaprine (FLEXERIL) 5 MG tablet; Take 1-2 tablets (5-10 mg total) by mouth at bedtime as needed for muscle spasms.  Dispense: 60 tablet; Refill: 1    Myofascial pain    Lumbar radiculitis  -     oxyCODONE-acetaminophen (PERCOCET/ENDOCET) 5-325 mg per tablet; Take 1 tablet by mouth every 8 (eight) hours as needed for pain (SEVERE Pain).  Dispense: 30 tablet; Refill: 0       Nathalie Erwin is a 43 y.o. y.o. female with past medical history significant for current smoker who presents today for follow-up regarding:    -Ongoing chronic right-sided low back pain with right lumbar radiculitis L5 dermatomal pattern with intermittent flareups typically lasting 3-4 days, pain is unchanged however manageable she states.    Patient is neurologically intact on exam.     Plan:     A shared decision making plan was used. The patient's values and choices were respected. Prior medical records from 6/22/18 were reviewed today. The following represents what was discussed and decided upon by the provider and the patient.        -DIAGNOSTIC TESTS: Images were personally reviewed and interpreted.   --Did discuss with patient that we can do an updated MRI of her pain worsens at any time however her pain is tolerable therefore we can hold  off.  --Previous lumbar MRI 2015 reveals right lateral recess stenosis L4-5 due to right paracentral disc protrusion and annular fissure with moderate foraminal stenosis that is still likely contribute to her acute on chronic flare up of her radicular pain at this time.    -INTERVENTIONS: No recommendations for injections at this time.  Did discuss that injections would be an option down the road after obtaining new MRI if symptoms worsen.  Patient prefers to avoid injections at this time.    -MEDICATIONS: Did refill gabapentin 300 mg so that she could titrate if needed up to 3 tablets 3 times daily as tolerated however she has been taking 2 tablets at nighttime on a regular basis did recommend that she at least takes 1 tablet in the morning and 2 at nighttime ongoing to  help prevent radicular pain flares specifically at nighttime.  -Also refilled Flexeril 5 mg 1-2 tablets at nighttime as needed as she feels this is more beneficial than methocarbamol.  Did discuss with patient she can take the methocarbamol during the daytime if she wants however she cannot take the medications at the same time.  Patient verbalizes understanding.  -Also refill Percocet 5/325 mg 1 tablet every 8 hours as needed for severe breakthrough pain only, last prescription 6/22/2018. #30 tablets given.  Did caution patient that she has been taking this slightly more than typical as her previous prescription lasted over 6 months and this 1 has been slightly over 3 months, patient was not aware of this and states she will try to take it only as needed for severe breakthrough pain which she does still just more frequently than previous.  This is why we recommended that she continue gabapentin more so on a regular basis so that hopefully she will not need the Percocet as much.  MN  checked. Discussed the risks (eg, addiction, overdose, worsening pain) verses benefit of opioid use with patient today. Explained that this medication will not be a  long term solution to ongoing pain. Discussed using lowest effective dose and the importance of other measures for pain management including PT, other non-opioid medications, behavioral treatments, and other procedure options.   Discussed side effects of medications and proper use. Patient verbalized understanding.    -PHYSICAL THERAPY: Encouraged patient continue with home exercises on a regular basis, did discuss the benefits of hallie chi incorporating that in her daily routine and she is interested in trying something like this, yoga was not good for her in the past as it did aggravate her pain.  Could always revisit physical therapy but she is doing well at this time.  Discussed the importance of core strengthening, ROM, stretching exercises with the patient and how each of these entities is important in decreasing pain.  Explained to the patient that the purpose of physical therapy is to teach the patient a home exercise program.  These exercises need to be performed every day in order to decrease pain and prevent future occurrences of pain.        -PATIENT EDUCATION:  25 minutes of total visit time was spent face to face with the patient today, 60 % of the visit was spent on counseling, education, and coordinating care.   -5 minutes spent outside of visit time, non-face-to-face time, reviewing chart.    -FOLLOW UP: Follow-up as needed.  Advised to contact clinic if symptoms worsen or change.    Subjective:     Nathalie Erwin is a 43 y.o. female who presents today for follow-up regarding chronic right greater than left bilateral low back pain that radiates to left posterior buttock with numbness and tingling to the left lateral thigh and lateral shin that is chronic in nature and unchanged currently 3/10, 2 to its best but up to an 8 at its worst.  She reports that it tolerable at this time but she is here for medication refills.  She does report that it slightly worse at nighttime lately where she is having a  hard time finding a good position therefore restarted the gabapentin on a regular basis and did take Flexeril last night as well and had a good night sleep.  Patient denies any recent trips or falls or balance changes, denies bowel or bladder dysfunction, denies any left leg symptoms.  Again pain is currently chronic and unchanged but also very tolerable.  Patient is diligent about doing her home exercises on a daily basis.    Treatment to Date:   Physical therapy in August 2015 with [facility] in [place] with some relief.  Physical therapy [facility] program [facility] 12 sessions last 9/20/2016 with minimal to no relief.  Patient continues to do daily home exercise program.      Medications:  Gabapentin 600 mg HS  Flexeril and ibuprofen with good relief.   Percocet for severe pain at night.   Medrol Dosepak 9/20/2016 and 2/6/17 with significant relief.   Methocarbamol with some benefit but she feels Flexeril is better at at bedtime    Current Outpatient Prescriptions on File Prior to Encounter   Medication Sig Dispense Refill     DOCOSAHEXANOIC ACID/EPA (FISH OIL ORAL) Take 1 tablet by mouth daily.       FLAXSEED OIL ORAL Take 1 tablet by mouth daily.       ibuprofen (ADVIL,MOTRIN) 800 MG tablet Take 1 tablet (800 mg total) by mouth every 8 (eight) hours as needed for pain. Take with FOOD 30 tablet 1     [DISCONTINUED] cyclobenzaprine (FLEXERIL) 5 MG tablet Take 1-2 tablets (5-10 mg total) by mouth bedtime as needed for muscle spasms. 60 tablet 2     [DISCONTINUED] gabapentin (NEURONTIN) 300 MG capsule Take 3 capsules (900 mg total) by mouth 3 (three) times a day. Follow Gabapentin Dosing chart given 270 capsule 3     [DISCONTINUED] methocarbamol (ROBAXIN) 500 MG tablet Take 1-2 tablets (500-1,000 mg total) by mouth 3 (three) times a day as needed. 60 tablet 0     [DISCONTINUED] oxyCODONE-acetaminophen (PERCOCET) 5-325 mg per tablet Take 1 tablet by mouth every 8 (eight) hours as needed for  pain (Severe Pain). 30 tablet 0     No current facility-administered medications on file prior to encounter.        Allergies   Allergen Reactions     Shellfish Containing Products      Crab leg made her face swell up x 10 years ago. Able to eat shrimp and lobster with no reaction.        No past medical history on file.     Review of Systems  ROS: Positive for numbness and tingling, very slight weakness right lower extremity.  Specifically negative for bowel/bladder dysfunction, balance changes, headache, dizziness, foot drop, fevers, chills, appetite changes, nausea/vomiting, unexplained weight loss. Otherwise 13 systems reviewed are negative. Please see the patient's intake questionnaire from today for details.    Reviewed Social, Family, Past Medical and Past Surgical history with patient, no significant changes noted since prior visit.     Objective:     /73 (Patient Site: Right Arm, Patient Position: Sitting)  Wt 163 lb (73.9 kg)  LMP 09/05/2018  SpO2 100%  BMI 27.12 kg/m2    PHYSICAL EXAMINATION:    --CONSTITUTIONAL: Well developed, well nourished, healthy appearing individual.  --PSYCHIATRIC: Appropriate mood and affect. No difficulty interacting due to temper, social withdrawal, or memory issues.  --SKIN: Lumbar region is dry and intact.   --RESPIRATORY: Normal rhythm and effort. No abnormal accessory muscle breathing patterns noted.   --MUSCULOSKELETAL:  Normal lumbar lordosis noted, no lateral shift.  --GROSS MOTOR: Easily arises from a seated position. Gait is non-antalgic  --LUMBAR SPINE:  Inspection reveals no evidence of deformity. Range of motion is not limited in lumbar flexion, extension, lateral rotation. No tenderness to palpation lumbar spine. Straight leg raising in the seated position is negative to radicular pain. Sciatic notch non-tender.   --LOWER EXTREMITY MOTOR TESTING:  Plantar flexion left 5/5, right 5/5   Dorsiflexion left 5/5, right 5/5   Great toe MTP extension left 5/5,  right 5/5  Knee flexion left 5/5, right 5/5  Knee extension left 5/5, right 5/5   Hip flexion left 5/5, right 5/5  Hip abduction left 5/5, right 5/5  Hip adduction left 5/5, right 5/5   --HIPS: Full range of motion bilaterally.   --NEUROLOGIC: Bilateral patellar and achilles reflexes are physiologic and symmetric. Sensation to light touch is intact in the bilateral L4, L5, and S1 dermatomes.    RESULTS:   Imaging: Lumbar spine imaging was reviewed today. The images were shown to the patient and the findings were explained using a spine model.      Lumbar MRI   6/16/2015  CONCLUSION:   1. At L3-L4 there is a small to moderate-sized left paracentral disc extrusion extending into the left lateral recess, contributing to moderate left lateral recess stenosis and possible impingement of the traversing left L4 nerve root.   2. At L4-L5 there is mild right lateral recess stenosis secondary to a shallow right paracentral disc protrusion with annular fissure. Mild to moderate right and mild left foraminal stenoses secondary to a diffuse annular bulge with osteophytic ridging.   3. Moderate Modic type I changes at the L3-L4 level.

## 2021-06-20 NOTE — PROGRESS NOTES
F/U  --Out of Percocet x 2 weeks and would like refill  --Also want new Rx for Gabapentin since her old Rx is 2 years old  --Wanting to stop Methocarbmaol and go back to Flexeril, want refill for Flexeril today  --Today C/O ongoing right low back pain and right buttock pain, no change  --Continue to have numbness in the right posterior thigh to the right calf  --Rates pain 4/10  --PT x 10 sessions ([facility]) [facility] 9/20/16 for lumbar    Medication  --Gabapentin 300 mg 2 caps at bedtime PRN  --Percocet 5-325 mg PRN, out x 2 weeks

## 2021-06-21 RX ORDER — GABAPENTIN 300 MG/1
CAPSULE ORAL
Qty: 270 CAPSULE | Refills: 3 | Status: SHIPPED | OUTPATIENT
Start: 2021-06-21 | End: 2021-12-06

## 2021-06-24 NOTE — PROGRESS NOTES
Assessment:     Diagnoses and all orders for this visit:    Chronic right-sided low back pain with right-sided sciatica  -     cyclobenzaprine (FLEXERIL) 5 MG tablet  Dispense: 60 tablet; Refill: 1  -     ibuprofen (ADVIL,MOTRIN) 600 MG tablet  Dispense: 91 tablet; Refill: 1  -     oxyCODONE-acetaminophen (PERCOCET) 5-325 mg per tablet  Dispense: 15 tablet; Refill: 0    Right lumbar radiculitis  -     ibuprofen (ADVIL,MOTRIN) 600 MG tablet  Dispense: 91 tablet; Refill: 1  -     oxyCODONE-acetaminophen (PERCOCET) 5-325 mg per tablet  Dispense: 15 tablet; Refill: 0    Lumbar disc herniation  -     cyclobenzaprine (FLEXERIL) 5 MG tablet  Dispense: 60 tablet; Refill: 1    Myofascial pain       Nathalie Erwin is a 44 y.o. y.o. female with past medical history significant for current smoker who presents today for follow-up regarding:    -Chronic bilateral low back pain and right lumbar radiculopathy flareup for the last week, typically pain is been more manageable and tolerable.    Patient is neurologically intact on exam.     Plan:     A shared decision making plan was used. The patient's values and choices were respected. Prior medical records from 10/5/18 were reviewed today. The following represents what was discussed and decided upon by the provider and the patient.        -DIAGNOSTIC TESTS: Images were personally reviewed and interpreted.   --Did discuss with patient that we can do an updated MRI of her pain worsens at any time however her pain is tolerable therefore we can hold off.  --Previous lumbar MRI 2015 reveals right lateral recess stenosis L4-5 due to right paracentral disc protrusion and annular fissure with moderate foraminal stenosis that is still likely contribute to her acute on chronic flare up of her radicular pain at this time.    -INTERVENTIONS: If pain continues to worsen we could consider injections down the road however patient prefers to hold off on injections which is understandable.  If we  would want to consider this we would need a new MRI as well.    -MEDICATIONS: Refilled Flexeril as well as ibuprofen 600 mg today.  -Also refilled Percocet 5/325 mg 1 tablet every 8 hours as needed for severe breakthrough pain for flareup only, 5 days worth of 15 tablets given today.  MN  checked. Discussed the risks (eg, addiction, overdose, worsening pain) verses benefit of opioid use with patient today. Explained that this medication will not be a long term solution to ongoing pain. Discussed using lowest effective dose and the importance of other measures for pain management including PT, other non-opioid medications, behavioral treatments, and other procedure options.   --We did also discuss trying over-the-counter CBD oil which I do think would be beneficial for her at nighttime when her pain is severe instead of Percocet ongoing.  She is going to research this and consider it.  Discussed side effects of medications and proper use. Patient verbalized understanding.    -PHYSICAL THERAPY: Encouraged patient to continue with home exercises from prior physical therapy sessions which she is doing diligently.  Advised patient to continue hallie chi on a regular basis which she also is doing diligently and feels significant benefit with.  Discussed the importance of core strengthening, ROM, stretching exercises with the patient and how each of these entities is important in decreasing pain.  Explained to the patient that the purpose of physical therapy is to teach the patient a home exercise program.  These exercises need to be performed every day in order to decrease pain and prevent future occurrences of pain.        -PATIENT EDUCATION:  25 minutes of total visit time was spent face to face with the patient today, 60 % of the visit was spent on counseling, education, and coordinating care.   -5 minutes spent outside of visit time, non-face-to-face time, reviewing chart.    -FOLLOW UP: Follow-up as needed.  Advised  to contact clinic if symptoms worsen or change.    Subjective:     Nathalie Erwin is a 44 y.o. female who presents today for follow-up regarding chronic bilateral low back pain with right buttock pain as well as numbness and tingling into the right lateral shin that is chronic in nature, typically tolerable but more aggravated for the last week currently her pain is a 6/10, a 3 to its best but up to an 8 at its worst.  She does report that walking makes her pain more bothersome changing positions or twisting can also be bothersome, she does notice improvement when she does her home exercises as well as hallie chi exercises on a regular basis.  She also feels that driving long distances and sitting for long periods of time is very aggravating to her as well.    Patient denies any left leg symptoms, denies any weakness lower extremity's, denies episodes of her leg giving out on her, denies recent trips or falls or balance changes, denies bowel or bladder dysfunction.    Treatment to Date:   Physical therapy in August 2015 with [facility] in [place] with some relief.  Physical therapy [facility] program [facility] 12 sessions last 9/20/2016 with minimal to no relief.  Patient continues to do daily home exercise program.      Medications:  Gabapentin 600 mg HS  Flexeril and ibuprofen with good relief.   Percocet for severe pain at night, last prescription 10/5/2018, number 30 tablets given.   Medrol Dosepak 9/20/2016 and 2/6/17 with significant relief.   Methocarbamol with some benefit but she feels Flexeril is better at at bedtime    Current Outpatient Medications on File Prior to Encounter   Medication Sig Dispense Refill     DOCOSAHEXANOIC ACID/EPA (FISH OIL ORAL) Take 1 tablet by mouth daily.       FLAXSEED OIL ORAL Take 1 tablet by mouth daily.       gabapentin (NEURONTIN) 300 MG capsule Take 3 capsules (900 mg total) by mouth 3 (three) times a day. Follow Gabapentin Dosing chart given 270 capsule  3     [DISCONTINUED] cyclobenzaprine (FLEXERIL) 5 MG tablet Take 1-2 tablets (5-10 mg total) by mouth at bedtime as needed for muscle spasms. 60 tablet 1     [DISCONTINUED] ibuprofen (ADVIL,MOTRIN) 800 MG tablet Take 1 tablet (800 mg total) by mouth every 8 (eight) hours as needed for pain. Take with FOOD 30 tablet 1     No current facility-administered medications on file prior to encounter.        Allergies   Allergen Reactions     Shellfish Containing Products      Crab leg made her face swell up x 10 years ago. Able to eat shrimp and lobster with no reaction.        No past medical history on file.     Review of Systems  ROS:  Specifically negative for bowel/bladder dysfunction, balance changes, headache, dizziness, foot drop, fevers, chills, appetite changes, nausea/vomiting, unexplained weight loss. Otherwise 13 systems reviewed are negative. Please see the patient's intake questionnaire from today for details.    Reviewed Social, Family, Past Medical and Past Surgical history with patient, no significant changes noted since prior visit.     Objective:     /78 (Patient Site: Right Arm, Patient Position: Sitting)   Pulse (!) 101   Wt 161 lb (73 kg)   SpO2 98%   BMI 26.79 kg/m      PHYSICAL EXAMINATION:    --CONSTITUTIONAL: Well developed, well nourished, healthy appearing individual.  --PSYCHIATRIC: Appropriate mood and affect. No difficulty interacting due to temper, social withdrawal, or memory issues.  --SKIN: Lumbar region is dry and intact.   --RESPIRATORY: Normal rhythm and effort. No abnormal accessory muscle breathing patterns noted.   --MUSCULOSKELETAL:  Normal lumbar lordosis noted, no lateral shift.  --GROSS MOTOR: Easily arises from a seated position. Gait is non-antalgic  --LUMBAR SPINE:  Inspection reveals no evidence of deformity.   --LOWER EXTREMITY MOTOR TESTING:  Plantar flexion left 5/5, right 5/5   Dorsiflexion left 5/5, right 5/5   Great toe MTP extension left 5/5, right 5/5  Knee  flexion left 5/5, right 5/5  Knee extension left 5/5, right 5/5   Hip flexion left 5/5, right 5/5  Hip abduction left 5/5, right 5/5  Hip adduction left 5/5, right 5/5   --HIPS: Full range of motion bilaterally.  --NEUROLOGIC: Bilateral patellar and achilles reflexes are physiologic and symmetric. Sensation to light touch is intact in the bilateral L4, L5, and S1 dermatomes.    RESULTS:   Imaging: Lumbar spine imaging was reviewed today. The images were shown to the patient and the findings were explained using a spine model.      Lumbar MRI   6/16/2015  CONCLUSION:   1. At L3-L4 there is a small to moderate-sized left paracentral disc extrusion extending into the left lateral recess, contributing to moderate left lateral recess stenosis and possible impingement of the traversing left L4 nerve root.   2. At L4-L5 there is mild right lateral recess stenosis secondary to a shallow right paracentral disc protrusion with annular fissure. Mild to moderate right and mild left foraminal stenoses secondary to a diffuse annular bulge with osteophytic ridging.   3. Moderate Modic type I changes at the L3-L4 level.

## 2021-06-24 NOTE — PATIENT INSTRUCTIONS - HE
Discussed the importance of core strengthening, ROM, stretching exercises with the patient and how each of these entities is important in decreasing pain.  Explained to the patient that the purpose of physical therapy is to teach the patient a home exercise program.  These exercises need to be performed every day in order to decrease pain and prevent future occurrences of pain.        ~Please call Nurse Navigation line [phone] with any questions or concerns about your treatment plan, if symptoms worsen and you would like to be seen urgently, or if you have problems controlling bladder and bowel function.  ~Follow Up Appointment time slots with Janneth Willett CNP with the [facility], are also available at the [facility] location near [facility] on the first and third THURSDAY afternoons of each month.

## 2021-06-26 ENCOUNTER — HEALTH MAINTENANCE LETTER (OUTPATIENT)
Age: 46
End: 2021-06-26

## 2021-07-03 NOTE — ADDENDUM NOTE
Addendum Note by Janneth Willett CNP at 2/14/2019 12:38 PM     Author: Janneth Willett CNP Service: -- Author Type: Nurse Practitioner    Filed: 2/14/2019  1:12 PM Date of Service: 2/14/2019 12:38 PM Status: Signed    : Janneth Willett CNP (Nurse Practitioner)    Encounter addended by: Janneth Willett CNP on: 2/14/2019  1:12 PM      Actions taken: Sign clinical note

## 2021-08-22 DIAGNOSIS — M54.41 CHRONIC RIGHT-SIDED LOW BACK PAIN WITH RIGHT-SIDED SCIATICA: ICD-10-CM

## 2021-08-22 DIAGNOSIS — G89.29 CHRONIC RIGHT-SIDED LOW BACK PAIN WITH RIGHT-SIDED SCIATICA: ICD-10-CM

## 2021-08-22 DIAGNOSIS — M54.16 RIGHT LUMBAR RADICULITIS: ICD-10-CM

## 2021-08-23 RX ORDER — IBUPROFEN 600 MG/1
TABLET, FILM COATED ORAL
Qty: 90 TABLET | Refills: 0 | Status: ON HOLD | OUTPATIENT
Start: 2021-08-23 | End: 2022-02-24

## 2021-09-01 DIAGNOSIS — M54.16 RIGHT LUMBAR RADICULITIS: Primary | ICD-10-CM

## 2021-09-01 RX ORDER — GABAPENTIN 300 MG/1
CAPSULE ORAL
Qty: 270 CAPSULE | Refills: 3 | Status: SHIPPED | OUTPATIENT
Start: 2021-09-01 | End: 2021-12-06

## 2021-10-16 ENCOUNTER — HEALTH MAINTENANCE LETTER (OUTPATIENT)
Age: 46
End: 2021-10-16

## 2021-12-02 ENCOUNTER — TELEPHONE (OUTPATIENT)
Dept: PHYSICAL MEDICINE AND REHAB | Facility: CLINIC | Age: 46
End: 2021-12-02

## 2021-12-02 NOTE — TELEPHONE ENCOUNTER
Prior Authorization Retail Medication Request    Medication/Dose: Gabapentin   ICD code (if different than what is on RX):    Previously Tried and Failed:    Rationale:      Insurance Name:  [employer]  Insurance ID:  [id number]      Pharmacy Information (if different than what is on RX)  Name:  [facility]   Phone:  [phone]

## 2021-12-02 NOTE — TELEPHONE ENCOUNTER
PA Initiation    Medication: gabapentin (NEURONTIN) 300 MG cap- INITIATED  Insurance Company: [employer] - Phone [phone] Fax [phone]  Pharmacy Filling the Rx: [id number] [place] [id number] [facility]  [facility]  Filling Pharmacy Phone: [phone]  Filling Pharmacy Fax: [phone]  Start Date: 12/2/2021

## 2021-12-03 NOTE — TELEPHONE ENCOUNTER
Prior Authorization Approval    Authorization Effective Date: 11/2/2021  Authorization Expiration Date: 12/2/2022  Medication: gabapentin (NEURONTIN) 300 MG cap- APPROVED  Approved Dose/Quantity: 270 CAPS  Reference #: [id number]   Insurance Company: [employer] - Phone [phone] Fax [phone]  Expected CoPay:       CoPay Card Available:      Foundation Assistance Needed:    Which Pharmacy is filling the prescription (Not needed for infusion/clinic administered): [facility] #[id number] [address]  AT [facility]  Pharmacy Notified: Yes, pharmacy has a paid claim  Patient Notified: Pharmacy will notify patient when ready          Central Prior Authorization Team  Phone: [phone]

## 2021-12-04 DIAGNOSIS — M54.16 RIGHT LUMBAR RADICULITIS: ICD-10-CM

## 2021-12-06 RX ORDER — GABAPENTIN 300 MG/1
CAPSULE ORAL
Qty: 270 CAPSULE | Refills: 3 | Status: SHIPPED | OUTPATIENT
Start: 2021-12-06 | End: 2022-03-16

## 2022-02-08 DIAGNOSIS — Z11.59 ENCOUNTER FOR SCREENING FOR OTHER VIRAL DISEASES: Primary | ICD-10-CM

## 2022-02-18 ENCOUNTER — TRANSFERRED RECORDS (OUTPATIENT)
Dept: MULTI SPECIALTY CLINIC | Facility: CLINIC | Age: 47
End: 2022-02-18
Payer: COMMERCIAL

## 2022-02-18 LAB
CREATININE (EXTERNAL): 0.65 MG/DL (ref 0.57–1.11)
GFR ESTIMATED (EXTERNAL): >60 ML/MIN/1.73M2
GFR ESTIMATED (IF AFRICAN AMERICAN) (EXTERNAL): >60 ML/MIN/1.73M2
GLUCOSE (EXTERNAL): 88 MG/DL (ref 65–100)
POTASSIUM (EXTERNAL): 4.4 MMOL/L (ref 3.5–5)

## 2022-02-21 ENCOUNTER — LAB (OUTPATIENT)
Dept: LAB | Facility: CLINIC | Age: 47
End: 2022-02-21
Attending: OBSTETRICS & GYNECOLOGY
Payer: COMMERCIAL

## 2022-02-21 DIAGNOSIS — Z11.59 ENCOUNTER FOR SCREENING FOR OTHER VIRAL DISEASES: ICD-10-CM

## 2022-02-21 PROCEDURE — U0003 INFECTIOUS AGENT DETECTION BY NUCLEIC ACID (DNA OR RNA); SEVERE ACUTE RESPIRATORY SYNDROME CORONAVIRUS 2 (SARS-COV-2) (CORONAVIRUS DISEASE [COVID-19]), AMPLIFIED PROBE TECHNIQUE, MAKING USE OF HIGH THROUGHPUT TECHNOLOGIES AS DESCRIBED BY CMS-2020-01-R: HCPCS

## 2022-02-21 PROCEDURE — U0005 INFEC AGEN DETEC AMPLI PROBE: HCPCS

## 2022-02-22 LAB — SARS-COV-2 RNA RESP QL NAA+PROBE: NEGATIVE

## 2022-02-23 ENCOUNTER — ANESTHESIA EVENT (OUTPATIENT)
Dept: SURGERY | Facility: HOSPITAL | Age: 47
DRG: 743 | End: 2022-02-23
Payer: COMMERCIAL

## 2022-02-24 ENCOUNTER — HOSPITAL ENCOUNTER (OUTPATIENT)
Facility: HOSPITAL | Age: 47
Discharge: HOME | DRG: 743 | End: 2022-02-24
Attending: OBSTETRICS & GYNECOLOGY | Admitting: OBSTETRICS & GYNECOLOGY
Payer: COMMERCIAL

## 2022-02-24 ENCOUNTER — ANESTHESIA (OUTPATIENT)
Dept: SURGERY | Facility: HOSPITAL | Age: 47
DRG: 743 | End: 2022-02-24
Payer: COMMERCIAL

## 2022-02-24 VITALS
BODY MASS INDEX: 30.51 KG/M2 | DIASTOLIC BLOOD PRESSURE: 89 MMHG | OXYGEN SATURATION: 96 % | RESPIRATION RATE: 16 BRPM | HEART RATE: 90 BPM | TEMPERATURE: 99.8 F | SYSTOLIC BLOOD PRESSURE: 121 MMHG | WEIGHT: 183.1 LBS | HEIGHT: 65 IN

## 2022-02-24 DIAGNOSIS — Z90.710 S/P LAPAROSCOPIC HYSTERECTOMY: Primary | ICD-10-CM

## 2022-02-24 LAB — HCG UR QL: NEGATIVE

## 2022-02-24 PROCEDURE — 0UT94ZZ RESECTION OF UTERUS, PERCUTANEOUS ENDOSCOPIC APPROACH: ICD-10-PCS | Performed by: OBSTETRICS & GYNECOLOGY

## 2022-02-24 PROCEDURE — 88342 IMHCHEM/IMCYTCHM 1ST ANTB: CPT | Mod: 26 | Performed by: PATHOLOGY

## 2022-02-24 PROCEDURE — 710N000009 HC RECOVERY PHASE 1, LEVEL 1, PER MIN: Performed by: OBSTETRICS & GYNECOLOGY

## 2022-02-24 PROCEDURE — 250N000011 HC RX IP 250 OP 636: Performed by: OBSTETRICS & GYNECOLOGY

## 2022-02-24 PROCEDURE — 258N000003 HC RX IP 258 OP 636: Performed by: ANESTHESIOLOGY

## 2022-02-24 PROCEDURE — 999N000141 HC STATISTIC PRE-PROCEDURE NURSING ASSESSMENT: Performed by: OBSTETRICS & GYNECOLOGY

## 2022-02-24 PROCEDURE — 88305 TISSUE EXAM BY PATHOLOGIST: CPT | Mod: TC | Performed by: OBSTETRICS & GYNECOLOGY

## 2022-02-24 PROCEDURE — 370N000017 HC ANESTHESIA TECHNICAL FEE, PER MIN: Performed by: OBSTETRICS & GYNECOLOGY

## 2022-02-24 PROCEDURE — 250N000013 HC RX MED GY IP 250 OP 250 PS 637: Performed by: OBSTETRICS & GYNECOLOGY

## 2022-02-24 PROCEDURE — 0TJB8ZZ INSPECTION OF BLADDER, VIA NATURAL OR ARTIFICIAL OPENING ENDOSCOPIC: ICD-10-PCS | Performed by: OBSTETRICS & GYNECOLOGY

## 2022-02-24 PROCEDURE — 81025 URINE PREGNANCY TEST: CPT | Performed by: OBSTETRICS & GYNECOLOGY

## 2022-02-24 PROCEDURE — 360N000077 HC SURGERY LEVEL 4, PER MIN: Performed by: OBSTETRICS & GYNECOLOGY

## 2022-02-24 PROCEDURE — 250N000011 HC RX IP 250 OP 636: Performed by: ANESTHESIOLOGY

## 2022-02-24 PROCEDURE — 258N000003 HC RX IP 258 OP 636: Performed by: NURSE ANESTHETIST, CERTIFIED REGISTERED

## 2022-02-24 PROCEDURE — 250N000025 HC SEVOFLURANE, PER MIN: Performed by: OBSTETRICS & GYNECOLOGY

## 2022-02-24 PROCEDURE — 250N000011 HC RX IP 250 OP 636: Performed by: NURSE ANESTHETIST, CERTIFIED REGISTERED

## 2022-02-24 PROCEDURE — 250N000013 HC RX MED GY IP 250 OP 250 PS 637: Performed by: ANESTHESIOLOGY

## 2022-02-24 PROCEDURE — 88305 TISSUE EXAM BY PATHOLOGIST: CPT | Mod: 26 | Performed by: PATHOLOGY

## 2022-02-24 PROCEDURE — 250N000009 HC RX 250: Performed by: ANESTHESIOLOGY

## 2022-02-24 PROCEDURE — 250N000009 HC RX 250: Performed by: NURSE ANESTHETIST, CERTIFIED REGISTERED

## 2022-02-24 PROCEDURE — 0UBMXZZ EXCISION OF VULVA, EXTERNAL APPROACH: ICD-10-PCS | Performed by: OBSTETRICS & GYNECOLOGY

## 2022-02-24 PROCEDURE — 710N000012 HC RECOVERY PHASE 2, PER MINUTE: Performed by: OBSTETRICS & GYNECOLOGY

## 2022-02-24 PROCEDURE — 88307 TISSUE EXAM BY PATHOLOGIST: CPT | Mod: 26 | Performed by: PATHOLOGY

## 2022-02-24 PROCEDURE — 272N000001 HC OR GENERAL SUPPLY STERILE: Performed by: OBSTETRICS & GYNECOLOGY

## 2022-02-24 PROCEDURE — 0UT74ZZ RESECTION OF BILATERAL FALLOPIAN TUBES, PERCUTANEOUS ENDOSCOPIC APPROACH: ICD-10-PCS | Performed by: OBSTETRICS & GYNECOLOGY

## 2022-02-24 RX ORDER — SODIUM CHLORIDE, SODIUM LACTATE, POTASSIUM CHLORIDE, CALCIUM CHLORIDE 600; 310; 30; 20 MG/100ML; MG/100ML; MG/100ML; MG/100ML
INJECTION, SOLUTION INTRAVENOUS CONTINUOUS
Status: DISCONTINUED | OUTPATIENT
Start: 2022-02-24 | End: 2022-02-24 | Stop reason: HOSPADM

## 2022-02-24 RX ORDER — ONDANSETRON 2 MG/ML
4 INJECTION INTRAMUSCULAR; INTRAVENOUS EVERY 30 MIN PRN
Status: DISCONTINUED | OUTPATIENT
Start: 2022-02-24 | End: 2022-02-24 | Stop reason: HOSPADM

## 2022-02-24 RX ORDER — LIDOCAINE 40 MG/G
CREAM TOPICAL
Status: DISCONTINUED | OUTPATIENT
Start: 2022-02-24 | End: 2022-02-24 | Stop reason: HOSPADM

## 2022-02-24 RX ORDER — IBUPROFEN 200 MG
800 TABLET ORAL ONCE
Status: DISCONTINUED | OUTPATIENT
Start: 2022-02-24 | End: 2022-02-24 | Stop reason: HOSPADM

## 2022-02-24 RX ORDER — LIDOCAINE HYDROCHLORIDE 20 MG/ML
INJECTION, SOLUTION INFILTRATION; PERINEURAL PRN
Status: DISCONTINUED | OUTPATIENT
Start: 2022-02-24 | End: 2022-02-24

## 2022-02-24 RX ORDER — ONDANSETRON 4 MG/1
4-8 TABLET, ORALLY DISINTEGRATING ORAL EVERY 8 HOURS PRN
Qty: 4 TABLET | Refills: 0 | Status: SHIPPED | OUTPATIENT
Start: 2022-02-24 | End: 2022-03-31

## 2022-02-24 RX ORDER — KETOROLAC TROMETHAMINE 30 MG/ML
15 INJECTION, SOLUTION INTRAMUSCULAR; INTRAVENOUS EVERY 6 HOURS PRN
Status: DISCONTINUED | OUTPATIENT
Start: 2022-02-24 | End: 2022-02-24 | Stop reason: HOSPADM

## 2022-02-24 RX ORDER — PROPOFOL 10 MG/ML
INJECTION, EMULSION INTRAVENOUS PRN
Status: DISCONTINUED | OUTPATIENT
Start: 2022-02-24 | End: 2022-02-24

## 2022-02-24 RX ORDER — CEFAZOLIN SODIUM 2 G/100ML
2 INJECTION, SOLUTION INTRAVENOUS
Status: COMPLETED | OUTPATIENT
Start: 2022-02-24 | End: 2022-02-24

## 2022-02-24 RX ORDER — KETAMINE HYDROCHLORIDE 50 MG/ML
INJECTION, SOLUTION INTRAMUSCULAR; INTRAVENOUS PRN
Status: DISCONTINUED | OUTPATIENT
Start: 2022-02-24 | End: 2022-02-24

## 2022-02-24 RX ORDER — PROPOFOL 10 MG/ML
INJECTION, EMULSION INTRAVENOUS CONTINUOUS PRN
Status: DISCONTINUED | OUTPATIENT
Start: 2022-02-24 | End: 2022-02-24

## 2022-02-24 RX ORDER — NALOXONE HYDROCHLORIDE 0.4 MG/ML
0.4 INJECTION, SOLUTION INTRAMUSCULAR; INTRAVENOUS; SUBCUTANEOUS
Status: DISCONTINUED | OUTPATIENT
Start: 2022-02-24 | End: 2022-02-24 | Stop reason: HOSPADM

## 2022-02-24 RX ORDER — CEFAZOLIN SODIUM 2 G/100ML
2 INJECTION, SOLUTION INTRAVENOUS SEE ADMIN INSTRUCTIONS
Status: DISCONTINUED | OUTPATIENT
Start: 2022-02-24 | End: 2022-02-24 | Stop reason: HOSPADM

## 2022-02-24 RX ORDER — HYDROMORPHONE HYDROCHLORIDE 1 MG/ML
0.2 INJECTION, SOLUTION INTRAMUSCULAR; INTRAVENOUS; SUBCUTANEOUS EVERY 5 MIN PRN
Status: DISCONTINUED | OUTPATIENT
Start: 2022-02-24 | End: 2022-02-24 | Stop reason: HOSPADM

## 2022-02-24 RX ORDER — ONDANSETRON 2 MG/ML
INJECTION INTRAMUSCULAR; INTRAVENOUS PRN
Status: DISCONTINUED | OUTPATIENT
Start: 2022-02-24 | End: 2022-02-24

## 2022-02-24 RX ORDER — ONDANSETRON 4 MG/1
4 TABLET, ORALLY DISINTEGRATING ORAL EVERY 30 MIN PRN
Status: DISCONTINUED | OUTPATIENT
Start: 2022-02-24 | End: 2022-02-24 | Stop reason: HOSPADM

## 2022-02-24 RX ORDER — IBUPROFEN 800 MG/1
800 TABLET, FILM COATED ORAL EVERY 6 HOURS PRN
Qty: 30 TABLET | Refills: 0 | Status: SHIPPED | OUTPATIENT
Start: 2022-02-24

## 2022-02-24 RX ORDER — MAGNESIUM SULFATE 4 G/50ML
4 INJECTION INTRAVENOUS ONCE
Status: COMPLETED | OUTPATIENT
Start: 2022-02-24 | End: 2022-02-24

## 2022-02-24 RX ORDER — ACETAMINOPHEN 325 MG/1
975 TABLET ORAL ONCE
Status: COMPLETED | OUTPATIENT
Start: 2022-02-24 | End: 2022-02-24

## 2022-02-24 RX ORDER — DEXAMETHASONE SODIUM PHOSPHATE 10 MG/ML
INJECTION, SOLUTION INTRAMUSCULAR; INTRAVENOUS PRN
Status: DISCONTINUED | OUTPATIENT
Start: 2022-02-24 | End: 2022-02-24

## 2022-02-24 RX ORDER — AMOXICILLIN 250 MG
1-2 CAPSULE ORAL 2 TIMES DAILY
Qty: 30 TABLET | Refills: 0 | Status: SHIPPED | OUTPATIENT
Start: 2022-02-24 | End: 2022-03-31

## 2022-02-24 RX ORDER — SCOLOPAMINE TRANSDERMAL SYSTEM 1 MG/1
1 PATCH, EXTENDED RELEASE TRANSDERMAL ONCE
Status: DISCONTINUED | OUTPATIENT
Start: 2022-02-24 | End: 2022-02-24 | Stop reason: HOSPADM

## 2022-02-24 RX ORDER — ACETAMINOPHEN 325 MG/1
975 TABLET ORAL ONCE
Status: DISCONTINUED | OUTPATIENT
Start: 2022-02-24 | End: 2022-02-24 | Stop reason: HOSPADM

## 2022-02-24 RX ORDER — OXYCODONE HYDROCHLORIDE 5 MG/1
5-10 TABLET ORAL EVERY 4 HOURS PRN
Qty: 18 TABLET | Refills: 0 | Status: SHIPPED | OUTPATIENT
Start: 2022-02-24 | End: 2022-03-31

## 2022-02-24 RX ORDER — BUPIVACAINE HYDROCHLORIDE 2.5 MG/ML
INJECTION, SOLUTION INFILTRATION; PERINEURAL PRN
Status: DISCONTINUED | OUTPATIENT
Start: 2022-02-24 | End: 2022-02-24 | Stop reason: HOSPADM

## 2022-02-24 RX ORDER — OXYCODONE HYDROCHLORIDE 5 MG/1
5 TABLET ORAL
Status: DISCONTINUED | OUTPATIENT
Start: 2022-02-24 | End: 2022-02-24 | Stop reason: HOSPADM

## 2022-02-24 RX ORDER — OXYCODONE HYDROCHLORIDE 5 MG/1
5 TABLET ORAL EVERY 4 HOURS PRN
Status: DISCONTINUED | OUTPATIENT
Start: 2022-02-24 | End: 2022-02-24 | Stop reason: HOSPADM

## 2022-02-24 RX ORDER — FENTANYL CITRATE 50 UG/ML
INJECTION, SOLUTION INTRAMUSCULAR; INTRAVENOUS PRN
Status: DISCONTINUED | OUTPATIENT
Start: 2022-02-24 | End: 2022-02-24

## 2022-02-24 RX ORDER — FENTANYL CITRATE 50 UG/ML
25 INJECTION, SOLUTION INTRAMUSCULAR; INTRAVENOUS
Status: DISCONTINUED | OUTPATIENT
Start: 2022-02-24 | End: 2022-02-24 | Stop reason: HOSPADM

## 2022-02-24 RX ORDER — FENTANYL CITRATE 50 UG/ML
25 INJECTION, SOLUTION INTRAMUSCULAR; INTRAVENOUS EVERY 5 MIN PRN
Status: DISCONTINUED | OUTPATIENT
Start: 2022-02-24 | End: 2022-02-24 | Stop reason: HOSPADM

## 2022-02-24 RX ORDER — NALOXONE HYDROCHLORIDE 0.4 MG/ML
0.2 INJECTION, SOLUTION INTRAMUSCULAR; INTRAVENOUS; SUBCUTANEOUS
Status: DISCONTINUED | OUTPATIENT
Start: 2022-02-24 | End: 2022-02-24 | Stop reason: HOSPADM

## 2022-02-24 RX ORDER — MEPERIDINE HYDROCHLORIDE 25 MG/ML
12.5 INJECTION INTRAMUSCULAR; INTRAVENOUS; SUBCUTANEOUS
Status: DISCONTINUED | OUTPATIENT
Start: 2022-02-24 | End: 2022-02-24 | Stop reason: HOSPADM

## 2022-02-24 RX ADMIN — FENTANYL CITRATE 100 MCG: 50 INJECTION, SOLUTION INTRAMUSCULAR; INTRAVENOUS at 07:52

## 2022-02-24 RX ADMIN — PROPOFOL 160 MG: 10 INJECTION, EMULSION INTRAVENOUS at 07:52

## 2022-02-24 RX ADMIN — KETAMINE HYDROCHLORIDE 25 MG: 50 INJECTION, SOLUTION INTRAMUSCULAR; INTRAVENOUS at 07:52

## 2022-02-24 RX ADMIN — PROPOFOL 30 MG: 10 INJECTION, EMULSION INTRAVENOUS at 12:00

## 2022-02-24 RX ADMIN — LIDOCAINE HYDROCHLORIDE 60 MG: 20 INJECTION, SOLUTION INFILTRATION; PERINEURAL at 07:52

## 2022-02-24 RX ADMIN — ROCURONIUM BROMIDE 20 MG: 50 INJECTION, SOLUTION INTRAVENOUS at 09:16

## 2022-02-24 RX ADMIN — PHENYLEPHRINE HYDROCHLORIDE 100 MCG: 10 INJECTION INTRAVENOUS at 08:03

## 2022-02-24 RX ADMIN — ACETAMINOPHEN 975 MG: 325 TABLET ORAL at 07:32

## 2022-02-24 RX ADMIN — SODIUM CHLORIDE, POTASSIUM CHLORIDE, SODIUM LACTATE AND CALCIUM CHLORIDE: 600; 310; 30; 20 INJECTION, SOLUTION INTRAVENOUS at 09:53

## 2022-02-24 RX ADMIN — SUGAMMADEX 200 MG: 100 INJECTION, SOLUTION INTRAVENOUS at 12:03

## 2022-02-24 RX ADMIN — MAGNESIUM SULFATE HEPTAHYDRATE 4 G: 80 INJECTION, SOLUTION INTRAVENOUS at 11:05

## 2022-02-24 RX ADMIN — PROPOFOL 40 MG: 10 INJECTION, EMULSION INTRAVENOUS at 12:01

## 2022-02-24 RX ADMIN — ROCURONIUM BROMIDE 50 MG: 50 INJECTION, SOLUTION INTRAVENOUS at 07:52

## 2022-02-24 RX ADMIN — SODIUM CHLORIDE, POTASSIUM CHLORIDE, SODIUM LACTATE AND CALCIUM CHLORIDE: 600; 310; 30; 20 INJECTION, SOLUTION INTRAVENOUS at 07:33

## 2022-02-24 RX ADMIN — ROCURONIUM BROMIDE 30 MG: 50 INJECTION, SOLUTION INTRAVENOUS at 08:23

## 2022-02-24 RX ADMIN — ROCURONIUM BROMIDE 10 MG: 50 INJECTION, SOLUTION INTRAVENOUS at 10:19

## 2022-02-24 RX ADMIN — MIDAZOLAM 2 MG: 1 INJECTION INTRAMUSCULAR; INTRAVENOUS at 07:35

## 2022-02-24 RX ADMIN — ROCURONIUM BROMIDE 20 MG: 50 INJECTION, SOLUTION INTRAVENOUS at 08:48

## 2022-02-24 RX ADMIN — SCOPALAMINE 1 PATCH: 1 PATCH, EXTENDED RELEASE TRANSDERMAL at 07:32

## 2022-02-24 RX ADMIN — HYDROMORPHONE HYDROCHLORIDE 0.5 MG: 1 INJECTION, SOLUTION INTRAMUSCULAR; INTRAVENOUS; SUBCUTANEOUS at 09:40

## 2022-02-24 RX ADMIN — DEXAMETHASONE SODIUM PHOSPHATE 10 MG: 10 INJECTION, SOLUTION INTRAMUSCULAR; INTRAVENOUS at 08:24

## 2022-02-24 RX ADMIN — PHENYLEPHRINE HYDROCHLORIDE 100 MCG: 10 INJECTION INTRAVENOUS at 08:10

## 2022-02-24 RX ADMIN — PROPOFOL 150 MCG/KG/MIN: 10 INJECTION, EMULSION INTRAVENOUS at 07:52

## 2022-02-24 RX ADMIN — PHENYLEPHRINE HYDROCHLORIDE 100 MCG: 10 INJECTION INTRAVENOUS at 08:13

## 2022-02-24 RX ADMIN — PHENYLEPHRINE HYDROCHLORIDE 100 MCG: 10 INJECTION INTRAVENOUS at 09:45

## 2022-02-24 RX ADMIN — PROPOFOL 50 MG: 10 INJECTION, EMULSION INTRAVENOUS at 08:23

## 2022-02-24 RX ADMIN — KETOROLAC TROMETHAMINE 15 MG: 30 INJECTION, SOLUTION INTRAMUSCULAR at 12:37

## 2022-02-24 RX ADMIN — CEFAZOLIN SODIUM 2 G: 2 INJECTION, SOLUTION INTRAVENOUS at 07:33

## 2022-02-24 RX ADMIN — MAGNESIUM SULFATE HEPTAHYDRATE 4 G: 80 INJECTION, SOLUTION INTRAVENOUS at 07:31

## 2022-02-24 RX ADMIN — ONDANSETRON 4 MG: 2 INJECTION INTRAMUSCULAR; INTRAVENOUS at 10:44

## 2022-02-24 RX ADMIN — HYDROMORPHONE HYDROCHLORIDE 0.5 MG: 1 INJECTION, SOLUTION INTRAMUSCULAR; INTRAVENOUS; SUBCUTANEOUS at 09:22

## 2022-02-24 RX ADMIN — PHENYLEPHRINE HYDROCHLORIDE 100 MCG: 10 INJECTION INTRAVENOUS at 08:23

## 2022-02-24 RX ADMIN — CEFAZOLIN SODIUM 2 G: 2 INJECTION, SOLUTION INTRAVENOUS at 11:19

## 2022-02-24 RX ADMIN — ROCURONIUM BROMIDE 20 MG: 50 INJECTION, SOLUTION INTRAVENOUS at 09:52

## 2022-02-24 RX ADMIN — PROPOFOL 50 MG: 10 INJECTION, EMULSION INTRAVENOUS at 11:44

## 2022-02-24 RX ADMIN — OXYCODONE HYDROCHLORIDE 5 MG: 5 TABLET ORAL at 13:41

## 2022-02-24 ASSESSMENT — ACTIVITIES OF DAILY LIVING (ADL)
ADLS_ACUITY_SCORE: 3

## 2022-02-24 ASSESSMENT — LIFESTYLE VARIABLES: TOBACCO_USE: 1

## 2022-02-24 NOTE — OP NOTE
Name:  Nathalie Erwin  Record # [id number]   : 1975  Care Provider: Nora Baez DO   Admit Date:  2022       Obstetrics Gynecology - Operative Report    DATE OF SERVICE: 2022     PREOPERATIVE DIAGNOSIS: uterine leiomyoma, enlarged uterus    POSTOPERATIVE DIAGNOSIS: same as above    PROCEDURE:  Total laparoscopic assisted hysterectomy, bilateral salpingectomy, excision of vulvar skin lesion and cystoscopy    FINDINGS:  Enlarged fibroid uterus, normal appearing fallopian tubes and ovaries bilaterally, small amount of endometriosis in the pelvic cul de sac was noted . Via cystoscopy normal bladder with eeflux of URINE from both ureters at the completion of the procedure.    PHYSICIAN:  Nora Baez DO     ASSISTANT:  Ortega Interiano MD; Heydi Jo    ESTIMATED BLOOD LOSS: 100cc    URINE OUTPUT: 200cc    ANESTHESIA:  General.    IV FLUIDS: See anesthesia record    SPECIMENS REMOVED:  Uterus, cervix and bilateral tubes. Vulvar skin lesion    COMPLICATIONS:  none noted.    INDICATION AND CONSENT:  Nathalie Erwin was seen in the office and noted to have an enlarged fibroid uterus, causing bulk symptoms, pain, and heavy menses. Discussed management options and she decided to proceed with definitive surgical management. In office EMB was negative for hyperplasia or malignancy. The patient was met preoperatively with her daughter where we discussed the procedure and the risks associated with the procedure.  She understood these to be, but not limited to injury to adjacent organs including bladder, bowel, ureter, infection and bleeding.  Patient signed consent and was brought back to the operating room in stable condition.    PROCEDURE:  Patient underwent induction of a general anesthetic, she was carefully prepped and draped for the procedure. Timeout was performed. Bladder was drained with a Fishman catheter, julio uterine manipulator placed into the uterus.      Attention was turned to the  abdomen.  An incision was made above the umbilicus.  A Veress needle was introduced with a 2 pop technique, saline drop test confirmed adequate placement. Opening pressure was 4mmHg.   Insufflation was now done until 15mm of pressure were established.  An 5mm trocar was placed above the umbilicus. The patient was placed in trendelenberg. Inspection of the abdomen revealed enlarged uterus however it it was mobile and we were able to get visualization so the decision was made to proceed laparoscopically. Two  5mm assist ports were placed to the right of the umbilicus.  A 5 mm trocar was placed in the left lateral quadrant.    The procedure began with identifying the anatomy.  The uterus appeared enlarged and mobile, bilateral fallopian tubes and ovaries appeared wnl, small amount of endometriosis noted in the posterior cul de sac.   The tube on the right side was cauterized, transected utilizing the ligasure device. The round ligament was then grasped, coagulated and transected.  The utero-ovarian ligament on the right side was then cauterized and transected. The anterior and posterior portions of the broad ligament were then carefully dissected down the the level of the uterine arteries which were then skeletonized. A bladder flap was also carefully created. The uterine vasculature on the right side near the uterocervical isthmus was then carefully cauterized and transected. This cauterization and transection was continued along the cervix until the level of the cardinal ligament.  This was carried out in similar fashion on the left side.   At this junction anterior colpotomy and posterior colpotomy were performed with the monopolar spatula. The uterus, cervix and tubes were then placed in the upper abdomen.  The vaginal cuff was closed with 0 V-lock  Suture in a running fashion, with the second layer overlapping the first. The pelvis was then irrigated and the vaginal cuff, and bilateral pedicles appeared  hemostatic. Surgicel powder was placed along the cuff and sites of dissection.    Attention was then turned back the abdomen where the supraumbilical incision was extended to accommodate a 5cm gel port. A 17cm encatch bag was carefully placed and opened in the abdomen. The uterus and tubes were placed in a 17cm endocatch bag. The bag was brought up to the gel port and the top was removed. A scalpel guard was placed to protect the subcutaneous and muscle layers. Care was then taken to hand morcellate with a scalpel the uterine specimen while it was entirely contained in the endocatch bag. Once the morcellation was complete, the bag was removed from the abdomen and inspected with its integrity noted to be intact. The top of the gel port was then replaced and the abdomen was insufflated. The abdomen was irrigated and remaining fluid and blood was evacuated. All sites of dissection remained hemostatic. The abdomen was desufflated and the the trocars and gel port were removed. The fascia of the supraumbilical incision was closed with 0 vicryl in a running fashion. The subcutaneous layer was reapproximated with 3-0 plain suture. The skin incisions were all closed subcuticularly with 4-0 Monocryl and exofin glue was applied on top.     I then turned my attention to the vulvar area and a condyloma appearing lesion was removed from the right vulva at the 7 oclock position. The defect was then closed with 4-0 monocryl in a running fashion. I then took my turn to cystoscopy, noting normal ureter and bladder anatomy. Strong eflux bilaterally was noted from both ureteral orfices.  At this point the procedure was finished. All specimens were passed off of the field with the uterus weighing 970gm. The vulvar lesion will also be sent to pathology. Sponge and instrument counts were correct x 2. The patient was woken from anesthesia without difficulty and taken to the recovery room in stable condition.     Nora Baez,    [facility]  [phone]

## 2022-02-24 NOTE — BRIEF OP NOTE
[facility]    Brief Operative Note    Pre-operative diagnosis: Leiomyoma [D21.9]  Menorrhagia [N92.0]  Post-operative diagnosis Same as pre-operative diagnosis    Procedure: Procedure(s):  TOTAL LAPAROSCOPIC HYSTERECTOMY WITH BILATERAL SALPINGECTOMY  EXCISION OF VULVAR SKIN TAG  Surgeon: Surgeon(s) and Role:     * Nora Baez DO - Primary     * Ortega Interiano MD - Assisting  Anesthesia: General   Estimated Blood Loss: 100cc    Drains: None  Specimens:   ID Type Source Tests Collected by Time Destination   1 : UTERUS , CERVIX, BILATERAL FALLOPIAN TUBES Tissue Uterus, Cervix, Bilateral Fallopian Tubes SURGICAL PATHOLOGY EXAM Nora Baez DO 2/24/2022 11:27 AM    2 : SKIN TAG VULVA Tissue Vulva SURGICAL PATHOLOGY EXAM Nora Baez DO 2/24/2022 11:29 AM      Findings:   enlarged fibroid uterus, normal appearing ovaries bilaterally, small amount of endometiosis appearing implants in posterior cul de sac.  Complications: None.  Implants: * No implants in log *      Nora Baez DO  [facility]  [phone]

## 2022-02-24 NOTE — INTERVAL H&P NOTE
I have reviewed the surgical (or preoperative) H&P that is linked to this encounter, and examined the patient. There are no significant changes.     Nora Baez, DO  [facility]  [phone]

## 2022-02-24 NOTE — ANESTHESIA CARE TRANSFER NOTE
"  Patient: Nathalie Erwin    Procedure: Procedure(s):  TOTAL LAPAROSCOPIC HYSTERECTOMY WITH BILATERAL SALPINGECTOMY  EXCISION OF VULVAR SKIN TAG       Diagnosis: Leiomyoma [D21.9]  Menorrhagia [N92.0]  Diagnosis Additional Information: No value filed.    Anesthesia Type:   General     Note:/70   Pulse 94   Temp 36.7  C (98  F) (Temporal)   Resp 10   Ht 1.651 m (5' 5\")   Wt 83.1 kg (183 lb 1.6 oz)   LMP 02/08/2022   SpO2 96%   BMI 30.47 kg/m        Oropharynx: oropharynx clear of all foreign objects  Level of Consciousness: drowsy  Oxygen Supplementation: face mask  Level of Supplemental Oxygen (L/min / FiO2): 8  Independent Airway: airway patency satisfactory and stable  Dentition: dentition unchanged  Vital Signs Stable: post-procedure vital signs reviewed and stable  Report to RN Given: handoff report given  Patient transferred to: PACU    Handoff Report: Identifed the Patient, Identified the Reponsible Provider, Reviewed the pertinent medical history, Discussed the surgical course, Reviewed Intra-OP anesthesia mangement and issues during anesthesia, Set expectations for post-procedure period and Allowed opportunity for questions and acknowledgement of understanding      Vitals:  Vitals Value Taken Time   /59 02/24/22 1220   Temp 36.7  C (98  F) 02/24/22 1218   Pulse 92 02/24/22 1221   Resp 16 02/24/22 1221   SpO2 97 % 02/24/22 1221   Vitals shown include unvalidated device data.    Electronically Signed By: Trista Michelle CRNA, APRN CRNA  February 24, 2022  12:23 PM  "

## 2022-02-24 NOTE — ANESTHESIA PREPROCEDURE EVALUATION
Anesthesia Pre-Procedure Evaluation    Patient: Nathalie Erwin   MRN: [id number] : 1975        Procedure : Procedure(s):  TOTAL LAPAROSCOPIC HYSTERECTOMY WITH BILATERAL SALPINGECTOMY  POSSIBLE ABDOMINAL HYSTERECTOMY  POSSIBLE EXCISION OF VULVAR SKIN TAG          Past Medical History:   Diagnosis Date     Chronic back pain      Fibroids      Menorrhagia      Uterine leiomyoma       Past Surgical History:   Procedure Laterality Date     GYN SURGERY      endometrial ablation      Allergies   Allergen Reactions     Shellfish Containing Products [Shellfish-Derived Products] Unknown     Crab leg made her face swell up x 10 years ago. Able to eat shrimp and lobster with no reaction.       Social History     Tobacco Use     Smoking status: Current Every Day Smoker     Packs/day: 0.50     Types: Cigarettes     Smokeless tobacco: Never Used   Substance Use Topics     Alcohol use: Not Currently      Wt Readings from Last 1 Encounters:   22 83.1 kg (183 lb 1.6 oz)        Anesthesia Evaluation            ROS/MED HX  ENT/Pulmonary:     (+) tobacco use,     Neurologic:  - neg neurologic ROS     Cardiovascular:  - neg cardiovascular ROS     METS/Exercise Tolerance:     Hematologic:  - neg hematologic  ROS     Musculoskeletal:  - neg musculoskeletal ROS     GI/Hepatic:  - neg GI/hepatic ROS     Renal/Genitourinary:  - neg Renal ROS     Endo:  - neg endo ROS     Psychiatric/Substance Use:  - neg psychiatric ROS     Infectious Disease:  - neg infectious disease ROS     Malignancy:  - neg malignancy ROS     Other:  - neg other ROS          Physical Exam    Airway  airway exam normal      Mallampati: II       Respiratory Devices and Support         Dental  no notable dental history         Cardiovascular   cardiovascular exam normal       Rhythm and rate: regular and normal     Pulmonary   pulmonary exam normal        breath sounds clear to auscultation           OUTSIDE LABS:  CBC: No results found for: WBC, HGB, HCT,  PLT  BMP: No results found for: NA, POTASSIUM, CHLORIDE, CO2, BUN, CR, GLC  COAGS: No results found for: PTT, INR, FIBR  POC: No results found for: BGM, HCG, HCGS  HEPATIC: No results found for: ALBUMIN, PROTTOTAL, ALT, AST, GGT, ALKPHOS, BILITOTAL, BILIDIRECT, VEE  OTHER: No results found for: PH, LACT, A1C, ANGIE, PHOS, MAG, LIPASE, AMYLASE, TSH, T4, T3, CRP, SED    Anesthesia Plan    ASA Status:  2      Anesthesia Type: General.     - Airway: ETT   Induction: Intravenous, Propofol.   Maintenance: Balanced.        Consents    Anesthesia Plan(s) and associated risks, benefits, and realistic alternatives discussed. Questions answered and patient/representative(s) expressed understanding.    - Discussed:     - Discussed with:  Patient      - Extended Intubation/Ventilatory Support Discussed: No.      - Patient is DNR/DNI Status: No    Use of blood products discussed: No .     Postoperative Care    Pain management: IV analgesics.   PONV prophylaxis: Ondansetron (or other 5HT-3), Dexamethasone or Solumedrol     Comments:    Other Comments: GAETT  Antiemetics  Tylenol po pre op  Ketamine after induction  Magnesium loyd op  Toradol at the end of case if okay with surgeon  Consider background propofol  Soft bite block            Lino Toledo MD

## 2022-02-24 NOTE — ANESTHESIA PROCEDURE NOTES
Airway       Patient location during procedure: OR       Procedure Start/Stop Times: 2/24/2022 7:56 AM  Staff -        Performed By: CRNA  Consent for Airway        Urgency: elective  Indications and Patient Condition       Indications for airway management: loyd-procedural and airway protection       Induction type:intravenous       Mask difficulty assessment: 1 - vent by mask    Final Airway Details       Final airway type: endotracheal airway       Successful airway: ETT - single  Endotracheal Airway Details        ETT size (mm): 7.0       Cuffed: yes       Successful intubation technique: direct laryngoscopy       DL Blade Type: Drummond 3       Grade View of Cords: 1       Adjucts: tooth guard       Position: Left       Measured from: lips       Secured at (cm): 22       Bite block used: Soft    Post intubation assessment        Placement verified by: capnometry and equal breath sounds        Number of attempts at approach: 1       Secured with: silk tape       Ease of procedure: easy       Dentition: Intact and Unchanged

## 2022-02-24 NOTE — OP NOTE
Operative Report    I assisted Dr Baez with the following procedure for patient Nathalie Erwin: Total laparoscopic hysterectomy, bilateral salpingectomy, cystoscopy, excision of vulvar skin tag. Standard procedure performed, see operative note by Dr Baez for details. My roles included performing one side of the hysterectomy, retraction, laparoscopic cuff closure, and hand morcellation. Specimen weighing 970g.     Ortega Interiano MD

## 2022-02-24 NOTE — PHARMACY-ADMISSION MEDICATION HISTORY
Pharmacy Note - Admission Medication History   ______________________________________________________________________    Prior To Admission (PTA) med list completed and updated in EMR.       PTA Med List   Medication Sig Last Dose     cyclobenzaprine (FLEXERIL) 5 MG tablet [CYCLOBENZAPRINE (FLEXERIL) 5 MG TABLET] TAKE 1-2 TABLET BY MOUTH EVERY NIGHT AT BEDTIME AS NEEDED FOR MUSCLE SPASMS More than a month     gabapentin (NEURONTIN) 300 MG capsule TAKE 3 CAPSULES BY MOUTH THREE TIMES DAILY 2/23/2022 at pm     ibuprofen (ADVIL/MOTRIN) 600 MG tablet TAKE 1 TABLET BY MOUTH EVERY 8 HOURS WITH FOOD AS NEEDED FOR PAIN Past Month       Information source(s): [facility]    Method of interview communication: in-person    Patient was asked about OTC/herbal products specifically.  PTA med list reflects this.    Based on the pharmacist's assessment, the PTA med list information appears reliable    Allergies were reviewed, assessed, and updated with the patient.      Patient does not use any multi-dose medications prior to admission.     Thank you for the opportunity to participate in the care of this patient.      Nimisha Vegas RPH     2/24/2022     6:54 AM

## 2022-02-28 LAB
PATH REPORT.COMMENTS IMP SPEC: NORMAL
PATH REPORT.FINAL DX SPEC: NORMAL
PATH REPORT.GROSS SPEC: NORMAL
PATH REPORT.MICROSCOPIC SPEC OTHER STN: NORMAL
PATH REPORT.RELEVANT HX SPEC: NORMAL
PHOTO IMAGE: NORMAL

## 2022-03-03 NOTE — ANESTHESIA POSTPROCEDURE EVALUATION
Patient: Nathalie Erwin    Procedure: Procedure(s):  TOTAL LAPAROSCOPIC HYSTERECTOMY WITH BILATERAL SALPINGECTOMY  EXCISION OF VULVAR SKIN TAG       Anesthesia Type:  General    Note:     Postop Pain Control: Uneventful            Sign Out: Well controlled pain   PONV: No   Neuro/Psych: Uneventful            Sign Out: Acceptable/Baseline neuro status   Airway/Respiratory: Uneventful            Sign Out: Acceptable/Baseline resp. status   CV/Hemodynamics: Uneventful            Sign Out: Acceptable CV status; No obvious hypovolemia; No obvious fluid overload   Other NRE: NONE   DID A NON-ROUTINE EVENT OCCUR? No    Event details/Postop Comments:  Per chart review on 3/3/22           Last vitals:  Vitals Value Taken Time   /79 02/24/22 1315   Temp 37.7  C (99.8  F) 02/24/22 1300   Pulse 90 02/24/22 1315   Resp 47 02/24/22 1315   SpO2 94 % 02/24/22 1300       Electronically Signed By: Lino Toledo MD  March 3, 2022  11:27 AM  
Patient: Nathalie Erwin    Procedure: Procedure(s):  TOTAL LAPAROSCOPIC HYSTERECTOMY WITH BILATERAL SALPINGECTOMY  EXCISION OF VULVAR SKIN TAG       Anesthesia Type:  General    Note:  Disposition: Inpatient   Postop Pain Control: Uneventful            Sign Out: Well controlled pain   PONV: No   Neuro/Psych: Uneventful            Sign Out: Acceptable/Baseline neuro status   Airway/Respiratory: Uneventful            Sign Out: Acceptable/Baseline resp. status   CV/Hemodynamics: Uneventful            Sign Out: Acceptable CV status; No obvious hypovolemia; No obvious fluid overload   Other NRE: NONE   DID A NON-ROUTINE EVENT OCCUR? No           Last vitals:  Vitals Value Taken Time   /59 02/24/22 1220   Temp 36.7  C (98  F) 02/24/22 1218   Pulse 86 02/24/22 1229   Resp 16 02/24/22 1229   SpO2 100 % 02/24/22 1229   Vitals shown include unvalidated device data.    Electronically Signed By: Lino Toledo MD  February 24, 2022  12:31 PM  
No

## 2022-03-16 DIAGNOSIS — M54.16 RIGHT LUMBAR RADICULITIS: ICD-10-CM

## 2022-03-16 RX ORDER — GABAPENTIN 300 MG/1
CAPSULE ORAL
Qty: 270 CAPSULE | Refills: 3 | Status: SHIPPED | OUTPATIENT
Start: 2022-03-16 | End: 2022-03-31

## 2022-03-16 NOTE — TELEPHONE ENCOUNTER
Patient was last seen 1 year ago and was told she will need a F/U appt with Janneth. Currently taking gabapentin 300 mg (3-3-3). Transferred to  to schedule F/U with Janneth.

## 2022-03-16 NOTE — TELEPHONE ENCOUNTER
Pharmacy sent refill request for gabapentin   --Med last Rx 12/10/2020   --Last OV 12/6/21 #270, 3 refills   --Future appt: none

## 2022-03-31 ENCOUNTER — VIRTUAL VISIT (OUTPATIENT)
Dept: PHYSICAL MEDICINE AND REHAB | Facility: CLINIC | Age: 47
End: 2022-03-31
Payer: COMMERCIAL

## 2022-03-31 DIAGNOSIS — M54.16 RIGHT LUMBAR RADICULITIS: ICD-10-CM

## 2022-03-31 DIAGNOSIS — G89.29 CHRONIC RIGHT-SIDED LOW BACK PAIN WITH RIGHT-SIDED SCIATICA: Primary | ICD-10-CM

## 2022-03-31 DIAGNOSIS — M54.41 CHRONIC RIGHT-SIDED LOW BACK PAIN WITH RIGHT-SIDED SCIATICA: Primary | ICD-10-CM

## 2022-03-31 DIAGNOSIS — M51.26 LUMBAR DISC HERNIATION: ICD-10-CM

## 2022-03-31 PROCEDURE — 99213 OFFICE O/P EST LOW 20 MIN: CPT | Mod: 95 | Performed by: NURSE PRACTITIONER

## 2022-03-31 RX ORDER — GABAPENTIN 300 MG/1
CAPSULE ORAL
Qty: 270 CAPSULE | Refills: 3 | Status: SHIPPED | OUTPATIENT
Start: 2022-03-31 | End: 2022-11-07

## 2022-03-31 ASSESSMENT — PAIN SCALES - GENERAL: PAINLEVEL: MODERATE PAIN (4)

## 2022-03-31 NOTE — LETTER
3/31/2022         RE: Nathalie Erwin  [address]  [place] [address]        Dear Colleague,    Thank you for referring your patient, Nathalie rEwin, to the [facility]. Please see a copy of my visit note below.    Nathalie Erwin is a 47 year old female who is being evaluated via a billable video visit.      How would you like to obtain your AVS? MyChart          Video-Visit Details    Type of service:  Video Visit    Originating Location (pt. Location): Home    Distant Location (provider location):  [facility]     Platform used for Video Visit: Doximity    Assessment:     Diagnoses and all orders for this visit:  Chronic right-sided low back pain with right-sided sciatica  Lumbar disc herniation  Right lumbar radiculitis  -     gabapentin (NEURONTIN) 300 MG capsule; TAKE 3 CAPSULES BY MOUTH THREE TIMES DAILY     Nathalie Erwin is a 47 year old y.o. female with past medical history significant for current smoker, hysterectomy February 2022, who presents today for follow-up regarding:    -Chronic right-sided low back pain that is overall tolerable, patient is doing well with medication management.     Plan:     A shared decision making plan was used. The patient's values and choices were respected. Prior medical records were reviewed today. The following represents what was discussed and decided upon by the provider and the patient.        -DIAGNOSTIC TESTS: Images were personally reviewed and interpreted.   --Did discuss with patient that if more frequent flareups more than twice a year I would recommend updated lumbar spine MRI however she has been doing quite well up until recent flare therefore can hold off at this time.  Patient denies lower extremity weakness.  --Previous lumbar MRI 2015 reveals right lateral recess stenosis L4-5 due to right paracentral disc protrusion and annular fissure with moderate foraminal stenosis that is still likely  contribute to her acute on chronic flare up of her radicular pain at this time.    -INTERVENTIONS: No need for injections at this time as patient is doing well    -MEDICATIONS: Refilled gabapentin 300 mg 3 tablets 3 times daily as patient is doing well with this regimen.  Discussed side effects of medications and proper use. Patient verbalized understanding.    -PHYSICAL THERAPY: Encourage patient continue with home exercises once she has been cleared from her abdominal surgery for lifting.  Discussed the importance of core strengthening, ROM, stretching exercises with the patient and how each of these entities is important in decreasing pain.  Explained to the patient that the purpose of physical therapy is to teach the patient a home exercise program.  These exercises need to be performed every day in order to decrease pain and prevent future occurrences of pain.        -PATIENT EDUCATION:  Total time of 28 minutes, on the day of service, spent with the patient, reviewing the chart, placing orders, and documenting.   -Today we also discussed the issues related to the current COVID-19 pandemic, the pros and cons of the current treatment plan, the CDC guidelines such as social distancing, washing the hands, and covering the cough.    -FOLLOW UP: Follow-up as needed  Advised to contact clinic if symptoms worsen or change.    Subjective:     Nathalie Erwin is a 47 year old female who presents today for follow-up regarding chronic recurrent right low back pain which is overall tolerable and improved in the last couple of years.  Currently her pain is a 4/10, 7 at its worst, 2 at its best aggravated with sitting for long, does improve with walking and laying down, she is here for a medication refill today as she is doing well with 300 mg gabapentin 3 tablets 3 times daily and wants to continue with this dosage.  Patient denies any lower extremity weakness, denies lower extremity pain, denies numbness or tingling  sensations.  Denies recent trips falls or balance changes, denies neck pain.    Treatment to Date:   Physical therapy in August 2015 with [facility] in [place] with some relief.  Physical therapy [facility] 12 sessions last 9/20/2016 with minimal to no relief.  Patient continues to do daily home exercise program.      Medications:  Gabapentin 600 mg HS  Flexeril and ibuprofen with good relief.   Medrol Dosepak 9/20/2016 and 2/6/17 with significant relief.   Methocarbamol with some benefit but she feels Flexeril is better at at bedtime.  Medrol Dosepak prescribed 9/24/2019, 1/10/2020, and 5/5/2020 with significant benefit.     Percocet for severe pain at night, takes very infrequently.    Current Outpatient Medications   Medication     cyclobenzaprine (FLEXERIL) 5 MG tablet     gabapentin (NEURONTIN) 300 MG capsule     ibuprofen (ADVIL/MOTRIN) 800 MG tablet     No current facility-administered medications for this visit.       Allergies   Allergen Reactions     Shellfish Containing Products [Shellfish-Derived Products] Unknown     Crab leg made her face swell up x 10 years ago. Able to eat shrimp and lobster with no reaction.        Past Medical History:   Diagnosis Date     Chronic back pain      Fibroids      Menorrhagia      Uterine leiomyoma         Review of Systems  ROS:  Specifically negative for bowel/bladder dysfunction, balance changes, headache, dizziness, foot drop, fevers, chills, appetite changes, nausea/vomiting, unexplained weight loss. Otherwise 13 systems reviewed are negative. Please see the patient's intake questionnaire from today for details.    Reviewed Social, Family, Past Medical and Past Surgical history with patient, no significant changes noted since prior visit.     Objective:     VIRTUAL PHYSICAL EXAM:   --CONSTITUTIONAL: Well developed, well nourished, healthy appearing individual.  --PSYCHIATRIC: Appropriate mood and affect. No difficulty  interacting due to temper, social withdrawal, or memory issues.  --SKIN: Lumbar region is visually dry and intact.   --RESPIRATORY: Normal rhythm and effort. No abnormal accessory muscle breathing patterns noted.   --STANDING EXAMINATION:  Normal lumbar lordosis noted, no lateral shift.  --MUSCULOSKELETAL: Lumbar spine inspection reveals no evidence of deformity.     RESULTS:   Imaging: Lumbar spine imaging was reviewed today. The images were shown to the patient and the findings were explained using a spine model.      Lumbar MRI   6/16/2015  CONCLUSION:   1. At L3-L4 there is a small to moderate-sized left paracentral disc extrusion extending into the left lateral recess, contributing to moderate left lateral recess stenosis and possible impingement of the traversing left L4 nerve root.   2. At L4-L5 there is mild right lateral recess stenosis secondary to a shallow right paracentral disc protrusion with annular fissure. Mild to moderate right and mild left foraminal stenoses secondary to a diffuse annular bulge with osteophytic ridging.   3. Moderate Modic type I changes at the L3-L4 level.                           Again, thank you for allowing me to participate in the care of your patient.        Sincerely,        Janneth Willett, CNP

## 2022-03-31 NOTE — PATIENT INSTRUCTIONS
~[facility] scheduling #[phone].  ~Please call our [facility] Nurse Navigation line [phone] with any questions or concerns about your treatment plan, if symptoms worsen and you would like to be seen urgently, or if you have problems controlling bladder and bowel function.      Importance of specialized Physical Therapy: Discussed the importance of core strengthening, ROM, stretching exercises with the patient and how each of these entities is important in decreasing pain.  Explained to the patient that the purpose of physical therapy is to teach the patient a home exercise program individualized to them and their specific health concerns.  These exercises need to be performed every day in order to decrease pain and prevent future occurrences of pain.

## 2022-07-23 ENCOUNTER — HEALTH MAINTENANCE LETTER (OUTPATIENT)
Age: 47
End: 2022-07-23

## 2022-10-01 ENCOUNTER — HEALTH MAINTENANCE LETTER (OUTPATIENT)
Age: 47
End: 2022-10-01

## 2022-11-06 DIAGNOSIS — M54.16 RIGHT LUMBAR RADICULITIS: ICD-10-CM

## 2022-11-07 RX ORDER — GABAPENTIN 300 MG/1
CAPSULE ORAL
Qty: 270 CAPSULE | Refills: 3 | Status: SHIPPED | OUTPATIENT
Start: 2022-11-07 | End: 2023-02-14

## 2023-02-14 DIAGNOSIS — M54.16 RIGHT LUMBAR RADICULITIS: ICD-10-CM

## 2023-02-14 RX ORDER — GABAPENTIN 300 MG/1
CAPSULE ORAL
Qty: 270 CAPSULE | Refills: 3 | Status: SHIPPED | OUTPATIENT
Start: 2023-02-14

## 2023-02-14 NOTE — TELEPHONE ENCOUNTER
Pharmacy sent refill request for gabapentin   --Med last Rx 11/7/22 #270, 3 refills   --Last OV 3/3/22   --Future appt: none  --Please advise

## 2023-08-06 ENCOUNTER — HEALTH MAINTENANCE LETTER (OUTPATIENT)
Age: 48
End: 2023-08-06

## 2024-09-29 ENCOUNTER — HEALTH MAINTENANCE LETTER (OUTPATIENT)
Age: 49
End: 2024-09-29

## (undated) DEVICE — KOH COLPOTOMIZER OCCLUDER  CPO-6

## (undated) DEVICE — PROTECTOR ARM STANDARD ONE STEP

## (undated) DEVICE — TUBING FILTER TRI-LUMEN AIRSEAL ASC-EVAC1

## (undated) DEVICE — UTERINE MANIPULATOR RUMI 6.7MMX10CM UMG670

## (undated) DEVICE — PREP CHLORAPREP 26ML TINTED HI-LITE ORANGE 930815

## (undated) DEVICE — ENDO ACCESS PLATFORM GELPOINT MINI CNGL3

## (undated) DEVICE — ESU LIGASURE LAPAROSCOPIC BLUNT TIP SEALER 5MMX37CM LF1837

## (undated) DEVICE — BRIEF STRETCH XL MPS40

## (undated) DEVICE — SU VICRYL+ 0 27 UR6 VLT VCP603H

## (undated) DEVICE — CATH FOLEY 5CC 16FR SIL/LTX 0165V16S

## (undated) DEVICE — BLADE KNIFE SURG 11 371111

## (undated) DEVICE — ENDO TROCAR SLEEVE KII ADV FIXATION 05X100MM CFS02

## (undated) DEVICE — PLATE GROUNDING ADULT W/CORD 9165L

## (undated) DEVICE — SOL WATER IRRIG 1000ML BOTTLE 2F7114

## (undated) DEVICE — MAT FLOOR SURGICAL 40X38 0702140238

## (undated) DEVICE — GLOVE BIOGEL PI ULTRATOUCH G SZ 6.0 42160

## (undated) DEVICE — Device

## (undated) DEVICE — BLADE KNIFE SURG 10 371110

## (undated) DEVICE — SU VICRYL+ 0 27IN CT-2 UND VCP270H

## (undated) DEVICE — APPLICATOR ENDOSCOPIC 5 SURGICEL POWDER 3123SPEA

## (undated) DEVICE — SYR 10ML FINGER CONTROL W/O NDL 309695

## (undated) DEVICE — GOWN IMPERVIOUS BREATHABLE SMART LG 89015

## (undated) DEVICE — DRAPE POUCH INSTRUMENT 3 POCKET 1018L

## (undated) DEVICE — SYR 50ML SLIP TIP W/O NDL 309654

## (undated) DEVICE — ENDO OBTURATOR ACCESS PORT BLADELESS 8X100MM IAS8-100LP

## (undated) DEVICE — GOWN LG DISP 9515

## (undated) DEVICE — SOL RINGERS LACTATED 1000ML BAG 2B2324X

## (undated) DEVICE — SYSTEM CLEARIFY VISUALIZATION 21-345

## (undated) DEVICE — NDL INSUFFLATION 13GA 120MM C2201

## (undated) DEVICE — SUCTION MANIFOLD NEPTUNE 2 SYS 1 PORT 702-025-000

## (undated) DEVICE — SU WND CLOSURE VLOC 180 ABS 0 12" GS-21 VLOCL0316

## (undated) DEVICE — SURGICEL POWDER ABSORBABLE HEMOSTAT 3GM 3013SP

## (undated) DEVICE — CUSTOM PACK PELVISCOPY SMA5BPVHEA

## (undated) DEVICE — ENDO SHEARS RENEW LAP ENDOCUT SCISSOR TIP 16.5MM 3142

## (undated) DEVICE — TISSUE EXTRACTOR SYSTEM ALEXIS GTK17

## (undated) DEVICE — SOLUTION IV 2B0304X STRL WATER 1000ML

## (undated) DEVICE — PAD POS XL 1X20X40IN PINK PIGAZZI

## (undated) DEVICE — TUBING LAP SUCT/IRRIG STRYKER 250070500

## (undated) DEVICE — GLOVE BIOGEL PI ULTRATOUCH G SZ 6.5 42165

## (undated) DEVICE — PREP SCRUB SOL EXIDINE 4% CHG 4OZ 29002-404

## (undated) DEVICE — GLOVE SURGEON PI ORTHO SZ 7 LF

## (undated) DEVICE — TUBING IRRIG TUR Y TYPE 96" LF 6543-01

## (undated) DEVICE — GLOVE UNDER INDICATOR PI SZ 6 LF 41660

## (undated) DEVICE — GOWN XLG DISP 9545

## (undated) DEVICE — ESU PENCIL SMOKE EVAC W/ROCKER SWITCH 0703-047-000

## (undated) DEVICE — TROCAR ADV FIXATION NONBLADED 5X100MM

## (undated) DEVICE — SYRINGE 10ML FILL SALINE FLUSH STERILE 306553

## (undated) DEVICE — DEVICE RUMI II KOH-EFFICIENT 3.5CM KC-RUMI-35

## (undated) DEVICE — SU MONOCRYL+ 4-0 18IN PS2 UND MCP496G

## (undated) RX ORDER — FENTANYL CITRATE-0.9 % NACL/PF 10 MCG/ML
PLASTIC BAG, INJECTION (ML) INTRAVENOUS
Status: DISPENSED
Start: 2022-02-24

## (undated) RX ORDER — PROPOFOL 10 MG/ML
INJECTION, EMULSION INTRAVENOUS
Status: DISPENSED
Start: 2022-02-24

## (undated) RX ORDER — FENTANYL CITRATE 50 UG/ML
INJECTION, SOLUTION INTRAMUSCULAR; INTRAVENOUS
Status: DISPENSED
Start: 2022-02-24

## (undated) RX ORDER — DEXAMETHASONE SODIUM PHOSPHATE 10 MG/ML
INJECTION, EMULSION INTRAMUSCULAR; INTRAVENOUS
Status: DISPENSED
Start: 2022-02-24

## (undated) RX ORDER — CEFAZOLIN SODIUM 1 G/3ML
INJECTION, POWDER, FOR SOLUTION INTRAMUSCULAR; INTRAVENOUS
Status: DISPENSED
Start: 2022-02-24

## (undated) RX ORDER — ONDANSETRON 2 MG/ML
INJECTION INTRAMUSCULAR; INTRAVENOUS
Status: DISPENSED
Start: 2022-02-24

## (undated) RX ORDER — LIDOCAINE HYDROCHLORIDE 20 MG/ML
INJECTION, SOLUTION INFILTRATION; PERINEURAL
Status: DISPENSED
Start: 2022-02-24

## (undated) RX ORDER — BUPIVACAINE HYDROCHLORIDE 2.5 MG/ML
INJECTION, SOLUTION INFILTRATION; PERINEURAL
Status: DISPENSED
Start: 2022-02-24

## (undated) RX ORDER — KETAMINE HYDROCHLORIDE 10 MG/ML
INJECTION INTRAMUSCULAR; INTRAVENOUS
Status: DISPENSED
Start: 2022-02-24